# Patient Record
Sex: MALE | Race: BLACK OR AFRICAN AMERICAN | NOT HISPANIC OR LATINO | Employment: UNEMPLOYED | URBAN - METROPOLITAN AREA
[De-identification: names, ages, dates, MRNs, and addresses within clinical notes are randomized per-mention and may not be internally consistent; named-entity substitution may affect disease eponyms.]

---

## 2023-11-28 ENCOUNTER — OFFICE VISIT (OUTPATIENT)
Dept: PRIMARY CARE | Facility: HOSPITAL | Age: 68
End: 2023-11-28
Payer: COMMERCIAL

## 2023-11-28 VITALS
HEIGHT: 72 IN | SYSTOLIC BLOOD PRESSURE: 123 MMHG | HEART RATE: 68 BPM | BODY MASS INDEX: 34.4 KG/M2 | OXYGEN SATURATION: 95 % | TEMPERATURE: 98.7 F | DIASTOLIC BLOOD PRESSURE: 75 MMHG | WEIGHT: 254 LBS

## 2023-11-28 DIAGNOSIS — Z13.220 SCREENING FOR HYPERLIPIDEMIA: ICD-10-CM

## 2023-11-28 DIAGNOSIS — M79.2 NEUROPATHIC PAIN: ICD-10-CM

## 2023-11-28 DIAGNOSIS — K59.00 CONSTIPATION, UNSPECIFIED CONSTIPATION TYPE: ICD-10-CM

## 2023-11-28 DIAGNOSIS — R42 DIZZINESS: ICD-10-CM

## 2023-11-28 DIAGNOSIS — M19.90 ARTHRITIS: ICD-10-CM

## 2023-11-28 DIAGNOSIS — Z13.1 DIABETES MELLITUS SCREENING: ICD-10-CM

## 2023-11-28 DIAGNOSIS — Z13.0 SCREENING FOR DEFICIENCY ANEMIA: Primary | ICD-10-CM

## 2023-11-28 DIAGNOSIS — Z12.5 SCREENING PSA (PROSTATE SPECIFIC ANTIGEN): ICD-10-CM

## 2023-11-28 DIAGNOSIS — M31.6 TEMPORAL ARTERITIS (MULTI): ICD-10-CM

## 2023-11-28 DIAGNOSIS — Z87.891 SMOKING HX: ICD-10-CM

## 2023-11-28 PROBLEM — R20.0 NUMBNESS AND TINGLING: Status: ACTIVE | Noted: 2023-11-28

## 2023-11-28 PROBLEM — H52.7 REFRACTION ERROR: Status: ACTIVE | Noted: 2023-11-28

## 2023-11-28 PROBLEM — M25.519 SHOULDER PAIN: Status: ACTIVE | Noted: 2023-11-28

## 2023-11-28 PROBLEM — H04.122 DRY EYE SYNDROME OF LEFT LACRIMAL GLAND: Status: ACTIVE | Noted: 2023-11-28

## 2023-11-28 PROBLEM — H04.129 DRY EYE SYNDROME: Status: ACTIVE | Noted: 2023-11-28

## 2023-11-28 PROBLEM — N52.9 ERECTILE DYSFUNCTION: Status: ACTIVE | Noted: 2023-11-28

## 2023-11-28 PROBLEM — R20.2 NUMBNESS AND TINGLING: Status: ACTIVE | Noted: 2023-11-28

## 2023-11-28 PROBLEM — M54.50 LOWER BACK PAIN: Status: ACTIVE | Noted: 2023-11-28

## 2023-11-28 PROBLEM — H52.02 HYPEROPIA OF LEFT EYE: Status: ACTIVE | Noted: 2023-11-28

## 2023-11-28 PROBLEM — S05.8X1A BLUNT TRAUMA OF RIGHT EYE: Status: ACTIVE | Noted: 2023-11-28

## 2023-11-28 PROBLEM — H25.812 COMBINED FORM OF AGE-RELATED CATARACT, LEFT EYE: Status: ACTIVE | Noted: 2023-11-28

## 2023-11-28 PROBLEM — R73.03 PREDIABETES: Status: ACTIVE | Noted: 2023-11-28

## 2023-11-28 PROBLEM — H26.9 CATARACT, LEFT: Status: ACTIVE | Noted: 2023-11-28

## 2023-11-28 PROBLEM — H25.13 NUCLEAR SCLEROSIS OF BOTH EYES: Status: ACTIVE | Noted: 2023-11-28

## 2023-11-28 PROBLEM — K21.9 GERD (GASTROESOPHAGEAL REFLUX DISEASE): Status: ACTIVE | Noted: 2023-11-28

## 2023-11-28 PROBLEM — N64.52 NIPPLE DISCHARGE: Status: ACTIVE | Noted: 2023-11-28

## 2023-11-28 PROBLEM — G62.9 NEUROPATHY: Status: ACTIVE | Noted: 2023-11-28

## 2023-11-28 PROBLEM — H40.003 GLAUCOMA SUSPECT OF BOTH EYES: Status: ACTIVE | Noted: 2023-11-28

## 2023-11-28 PROBLEM — E11.9 DIABETES MELLITUS TYPE 2 WITHOUT RETINOPATHY (MULTI): Status: ACTIVE | Noted: 2023-11-28

## 2023-11-28 LAB
ALBUMIN SERPL BCP-MCNC: 4.1 G/DL (ref 3.4–5)
ALP SERPL-CCNC: 58 U/L (ref 33–136)
ALT SERPL W P-5'-P-CCNC: 12 U/L (ref 10–52)
ANION GAP SERPL CALC-SCNC: 11 MMOL/L (ref 10–20)
AST SERPL W P-5'-P-CCNC: 10 U/L (ref 9–39)
BILIRUB SERPL-MCNC: 0.4 MG/DL (ref 0–1.2)
BUN SERPL-MCNC: 13 MG/DL (ref 6–23)
CALCIUM SERPL-MCNC: 9 MG/DL (ref 8.6–10.6)
CHLORIDE SERPL-SCNC: 108 MMOL/L (ref 98–107)
CHOLEST SERPL-MCNC: 246 MG/DL (ref 0–199)
CHOLESTEROL/HDL RATIO: 7.5
CO2 SERPL-SCNC: 26 MMOL/L (ref 21–32)
CREAT SERPL-MCNC: 1.08 MG/DL (ref 0.5–1.3)
CRP SERPL-MCNC: 0.54 MG/DL
ERYTHROCYTE [DISTWIDTH] IN BLOOD BY AUTOMATED COUNT: 15.9 % (ref 11.5–14.5)
ERYTHROCYTE [SEDIMENTATION RATE] IN BLOOD BY WESTERGREN METHOD: 33 MM/H (ref 0–20)
EST. AVERAGE GLUCOSE BLD GHB EST-MCNC: 126 MG/DL
GFR SERPL CREATININE-BSD FRML MDRD: 74 ML/MIN/1.73M*2
GLUCOSE BLD MANUAL STRIP-MCNC: 111 MG/DL (ref 74–99)
GLUCOSE SERPL-MCNC: 99 MG/DL (ref 74–99)
HBA1C MFR BLD: 6 %
HCT VFR BLD AUTO: 46.4 % (ref 41–52)
HDLC SERPL-MCNC: 32.6 MG/DL
HGB BLD-MCNC: 14 G/DL (ref 13.5–17.5)
LDLC SERPL CALC-MCNC: 169 MG/DL
MCH RBC QN AUTO: 24.9 PG (ref 26–34)
MCHC RBC AUTO-ENTMCNC: 30.2 G/DL (ref 32–36)
MCV RBC AUTO: 83 FL (ref 80–100)
NON HDL CHOLESTEROL: 213 MG/DL (ref 0–149)
NRBC BLD-RTO: 0 /100 WBCS (ref 0–0)
PLATELET # BLD AUTO: 226 X10*3/UL (ref 150–450)
POTASSIUM SERPL-SCNC: 4.2 MMOL/L (ref 3.5–5.3)
PROT SERPL-MCNC: 6.8 G/DL (ref 6.4–8.2)
PSA SERPL-MCNC: 0.73 NG/ML
RBC # BLD AUTO: 5.62 X10*6/UL (ref 4.5–5.9)
RHEUMATOID FACT SER NEPH-ACNC: <10 IU/ML (ref 0–15)
SODIUM SERPL-SCNC: 141 MMOL/L (ref 136–145)
TRIGL SERPL-MCNC: 222 MG/DL (ref 0–149)
TSH SERPL-ACNC: 2.2 MIU/L (ref 0.44–3.98)
VLDL: 44 MG/DL (ref 0–40)
WBC # BLD AUTO: 6.8 X10*3/UL (ref 4.4–11.3)

## 2023-11-28 PROCEDURE — 80061 LIPID PANEL: CPT

## 2023-11-28 PROCEDURE — 36416 COLLJ CAPILLARY BLOOD SPEC: CPT

## 2023-11-28 PROCEDURE — 36416 COLLJ CAPILLARY BLOOD SPEC: CPT | Mod: GC

## 2023-11-28 PROCEDURE — 1036F TOBACCO NON-USER: CPT

## 2023-11-28 PROCEDURE — 36415 COLL VENOUS BLD VENIPUNCTURE: CPT

## 2023-11-28 PROCEDURE — 90662 IIV NO PRSV INCREASED AG IM: CPT | Mod: GC

## 2023-11-28 PROCEDURE — 99215 OFFICE O/P EST HI 40 MIN: CPT | Mod: GC,25

## 2023-11-28 PROCEDURE — 80053 COMPREHEN METABOLIC PANEL: CPT

## 2023-11-28 PROCEDURE — 82947 ASSAY GLUCOSE BLOOD QUANT: CPT

## 2023-11-28 PROCEDURE — 85027 COMPLETE CBC AUTOMATED: CPT

## 2023-11-28 PROCEDURE — 86200 CCP ANTIBODY: CPT

## 2023-11-28 PROCEDURE — 85652 RBC SED RATE AUTOMATED: CPT

## 2023-11-28 PROCEDURE — 3044F HG A1C LEVEL LT 7.0%: CPT

## 2023-11-28 PROCEDURE — 3074F SYST BP LT 130 MM HG: CPT

## 2023-11-28 PROCEDURE — 86431 RHEUMATOID FACTOR QUANT: CPT

## 2023-11-28 PROCEDURE — 99215 OFFICE O/P EST HI 40 MIN: CPT

## 2023-11-28 PROCEDURE — 3078F DIAST BP <80 MM HG: CPT

## 2023-11-28 PROCEDURE — 86038 ANTINUCLEAR ANTIBODIES: CPT

## 2023-11-28 PROCEDURE — 86235 NUCLEAR ANTIGEN ANTIBODY: CPT

## 2023-11-28 PROCEDURE — 84443 ASSAY THYROID STIM HORMONE: CPT

## 2023-11-28 PROCEDURE — 84153 ASSAY OF PSA TOTAL: CPT

## 2023-11-28 PROCEDURE — 83036 HEMOGLOBIN GLYCOSYLATED A1C: CPT

## 2023-11-28 PROCEDURE — 86140 C-REACTIVE PROTEIN: CPT

## 2023-11-28 PROCEDURE — 3050F LDL-C >= 130 MG/DL: CPT

## 2023-11-28 PROCEDURE — 1125F AMNT PAIN NOTED PAIN PRSNT: CPT

## 2023-11-28 RX ORDER — SILDENAFIL CITRATE 20 MG/1
20 TABLET ORAL AS NEEDED
COMMUNITY
Start: 2018-03-20 | End: 2023-11-28 | Stop reason: WASHOUT

## 2023-11-28 RX ORDER — OMEPRAZOLE 40 MG/1
1 CAPSULE, DELAYED RELEASE ORAL DAILY
COMMUNITY
Start: 2019-10-14 | End: 2024-01-16 | Stop reason: WASHOUT

## 2023-11-28 RX ORDER — TADALAFIL 20 MG/1
20 TABLET ORAL
COMMUNITY
Start: 2019-09-10 | End: 2023-11-28 | Stop reason: WASHOUT

## 2023-11-28 RX ORDER — LIDOCAINE 50 MG/G
1 PATCH TOPICAL DAILY
COMMUNITY
Start: 2023-01-21 | End: 2023-11-28 | Stop reason: SDUPTHER

## 2023-11-28 RX ORDER — PREGABALIN 25 MG/1
25 CAPSULE ORAL 3 TIMES DAILY
Qty: 270 CAPSULE | Refills: 2 | Status: SHIPPED | OUTPATIENT
Start: 2023-11-28 | End: 2024-01-16 | Stop reason: SDUPTHER

## 2023-11-28 RX ORDER — DICLOFENAC SODIUM 10 MG/G
4 GEL TOPICAL 2 TIMES DAILY PRN
Qty: 4 G | Refills: 3 | Status: SHIPPED | OUTPATIENT
Start: 2023-11-28 | End: 2024-01-16 | Stop reason: WASHOUT

## 2023-11-28 RX ORDER — TIZANIDINE HYDROCHLORIDE 2 MG/1
2 CAPSULE, GELATIN COATED ORAL DAILY
COMMUNITY
Start: 2023-01-21 | End: 2023-11-28 | Stop reason: WASHOUT

## 2023-11-28 RX ORDER — POLYETHYLENE GLYCOL 3350 17 G/17G
17 POWDER, FOR SOLUTION ORAL DAILY
Qty: 30 PACKET | Refills: 3 | Status: SHIPPED | OUTPATIENT
Start: 2023-11-28 | End: 2024-01-16 | Stop reason: WASHOUT

## 2023-11-28 RX ORDER — LIDOCAINE 50 MG/G
1 PATCH TOPICAL DAILY
Qty: 30 PATCH | Refills: 2 | Status: SHIPPED | OUTPATIENT
Start: 2023-11-28 | End: 2024-02-26

## 2023-11-28 RX ORDER — MELOXICAM 15 MG/1
15 TABLET ORAL DAILY
COMMUNITY
Start: 2023-03-15 | End: 2023-11-28 | Stop reason: WASHOUT

## 2023-11-28 RX ORDER — METFORMIN HYDROCHLORIDE 500 MG/1
2 TABLET ORAL DAILY
COMMUNITY
Start: 2017-09-11 | End: 2023-11-28 | Stop reason: WASHOUT

## 2023-11-28 RX ORDER — IBUPROFEN 600 MG/1
600 TABLET ORAL EVERY 8 HOURS PRN
COMMUNITY
Start: 2023-09-15 | End: 2023-11-28 | Stop reason: WASHOUT

## 2023-11-28 RX ORDER — DICLOFENAC SODIUM 10 MG/G
4 GEL TOPICAL 2 TIMES DAILY PRN
COMMUNITY
Start: 2023-05-04 | End: 2023-11-28 | Stop reason: SDUPTHER

## 2023-11-28 RX ORDER — AMOXICILLIN 500 MG/1
500 CAPSULE ORAL EVERY 8 HOURS SCHEDULED
COMMUNITY
Start: 2023-09-11 | End: 2023-11-28 | Stop reason: WASHOUT

## 2023-11-28 ASSESSMENT — ENCOUNTER SYMPTOMS
LOSS OF SENSATION IN FEET: 0
DEPRESSION: 0
OCCASIONAL FEELINGS OF UNSTEADINESS: 0

## 2023-11-28 ASSESSMENT — LIFESTYLE VARIABLES
HOW MANY STANDARD DRINKS CONTAINING ALCOHOL DO YOU HAVE ON A TYPICAL DAY: PATIENT DOES NOT DRINK
HOW OFTEN DO YOU HAVE A DRINK CONTAINING ALCOHOL: NEVER
HOW OFTEN DO YOU HAVE SIX OR MORE DRINKS ON ONE OCCASION: NEVER
SKIP TO QUESTIONS 9-10: 1
AUDIT-C TOTAL SCORE: 0

## 2023-11-28 ASSESSMENT — PATIENT HEALTH QUESTIONNAIRE - PHQ9
1. LITTLE INTEREST OR PLEASURE IN DOING THINGS: NOT AT ALL
2. FEELING DOWN, DEPRESSED OR HOPELESS: NOT AT ALL
SUM OF ALL RESPONSES TO PHQ9 QUESTIONS 1 & 2: 0

## 2023-11-28 ASSESSMENT — PAIN SCALES - GENERAL: PAINLEVEL: 8

## 2023-11-28 NOTE — PROGRESS NOTES
Chief complaint: bilateral leg pain    HPI:  Kamaljit Champion Jr. is a 68 y.o. male w/ PMH of Pre-DM and ED who presents today for follow up.     Pt was last seen in 7/2022 when he endorsed dizzy spells which he reported are still there. Pt reported that he does feel as if the room is spinning, and stated getting up makes dizziness worse but, not turning his head. Pt reported he has not taken anything for the dizziness.  He stated that he also has been having monocular vision changes and temporal sided headaches. He also reported that for the past 2 -3 years he has been having leg pain/swelling. Pt described the pain as a burning sensation that starts from the knees to the bottom of his feet. Pt has been taking Ibuprofen and Tylenol for the pain and they have provided minimal relief. Pt denied polyuria and polydipsia. Pt has not been taking his metformin for the past year. Pt took Duloxetine and Gabapentin previously but, his symptoms were not improved with medications. Pt also endorsed rectal ulcer for the past 2 months which has not been improving. He denied any leakage of any stool/urine/blood from ulcer. Pt denied taking anything for the issue. Lastly, pt endorsed left sided shoulder pain for the past 6 months. Pt descried the sensation as sharp, made worse with movement. Previously was on Tylenol and Ibuprofen which provided minimal relief. Pt stated that he ran out of all his medication last year and has not taken anything since.        Health maintenance:  Health Maintenance   Topic Date Due    Yearly Adult Physical  Never done    Lipid Panel  Never done    Diabetes: Hemoglobin A1C  Never done    COVID-19 Vaccine (1) Never done    Diabetes: Foot Exam  Never done    Diabetes: Retinopathy Screening  Never done    Hepatitis C Screening  Never done    Diabetes: Urine Protein Screening  Never done    Zoster Vaccines (2 of 3) 05/12/2015    Pneumococcal Vaccine: 65+ Years (3 - PPSV23 or PCV20) 03/17/2020    DTaP/Tdap/Td  Vaccines (2 - Td or Tdap) 01/20/2024    Colorectal Cancer Screening  03/10/2027    Influenza Vaccine  Completed    HIB Vaccines  Aged Out    Hepatitis B Vaccines  Aged Out    IPV Vaccines  Aged Out    Hepatitis A Vaccines  Aged Out    Meningococcal Vaccine  Aged Out    Rotavirus Vaccines  Aged Out    HPV Vaccines  Aged Out    Irritable Bowel Syndrome  Discontinued       Medications:    Current Outpatient Medications:     diclofenac sodium (Voltaren) 1 % gel gel, Apply 1 Application topically 2 times a day as needed (left shoulder pain)., Disp: 4 g, Rfl: 3    lidocaine (Lidoderm) 5 % patch, Place 1 patch over 12 hours on the skin once daily., Disp: 30 patch, Rfl: 2    omeprazole (PriLOSEC) 40 mg DR capsule, Take 1 capsule (40 mg) by mouth once daily., Disp: , Rfl:     polyethylene glycol (Glycolax, Miralax) 17 gram packet, Take 17 g by mouth once daily. Mix 1 cap (17g) into 8 ounces of fluid., Disp: 30 packet, Rfl: 3    pregabalin (Lyrica) 25 mg capsule, Take 1 capsule (25 mg) by mouth 3 times a day., Disp: 270 capsule, Rfl: 2    Allergies:  No Known Allergies    Past medical history:  Past Medical History:   Diagnosis Date    Left testicular pain 09/10/2019    Testicular pain, left    Personal history of nicotine dependence 03/19/2019    History of nicotine dependence    Personal history of other endocrine, nutritional and metabolic disease     History of diabetes mellitus    Pruritus ani 09/10/2019    Perianal itch    Spermatocele of epididymis, unspecified 09/10/2019    Spermatocele       Surgical history:  Past Surgical History:   Procedure Laterality Date    OTHER SURGICAL HISTORY  02/10/2017    Globe Enucleation Right       Family history:  No family history on file.    Social history:  Smoking- Stopped 8 years ago, 40 years ½ PPD  EOTH-Denied  Rec Drug- Crack Cocaine     Review of systems:  Constitutional: negative for fevers, chills, weight loss, weight gain, change in appetite, fatigue, weakness.  HEENT: +  headache, + changes in vision, negative hearing loss, congestion, sore throat.  Respiratory: negative for SOB, cough, hemoptysis, wheezing  Cardiovascular: negative for chest pain, palpitations, orthopnea, PND  GI: negative for dysphagia, abdominal pain, nausea, vomiting, diarrhea, constipation, melena, hematochezia, BRBPR  : negative for frequency, urgency, dysuria, hematuria, incontinence  MSK: negative for myalgia, + arthralgia, decreased joint ROM, LE swelling  Skin: negative for rash, wounds  Heme/lymph: negative for easy bruising, bleeding, epistaxis  Neuro: negative for LOC, numbness, tingling, tremor, +vertigo, +dizziness    Vitals:  Vitals:    11/28/23 1507   BP: 123/75   Pulse: 68   Temp: 37.1 °C (98.7 °F)   SpO2: 95%       Physical exam:  Constitutional: Well-developed male in no acute distress.  HEENT: Normocephalic, atraumatic. PERRL. EOMI. No cervical lymphadenopathy. Temporal tenderness  Respiratory: CTA bilaterally. No wheezes, rales, or rhonchi. Normal respiratory effort.  Cardiovascular: RRR. No murmurs, gallops, or rubs. No JVD. Radial pulses 2+.  Abdominal: Soft, nondistended, nontender to palpation. Bowel sounds present. No hepatosplenomegaly or masses. No CVA tenderness.  Neuro: CN II-XII intact. UE and LE strength 5/5 bilaterally and sensation intact. Normal FTN testing.  MSK: No LE edema bilaterally. Bilateral lower extremity tenderness  Skin: Warm, dry. No rashes or wounds.  Psych: Appropriate mood and affect.    Labs:  Results for orders placed or performed in visit on 11/28/23 (from the past 24 hour(s))   POCT GLUCOSE   Result Value Ref Range    POCT Glucose 111 (H) 74 - 99 mg/dL       Imaging:  No results found.    Assessment and plan:  Kamaljit Champion Jr. is a 68 y.o. male w/ PMH of Pre-DM and ED who presents today for follow up.     #Dizziness  :: Ddx BPPV vs Dysequilibrium  :: Orothstats negative  -Neuro referral for possible Vestibular rehab as well as evaluation of  neuropathy  -Consider Meclizine 12.5 mg PRN for vertigo at next visit    #Headache/Vision Changes  :: Possibly could be suggestive of Temporal arteritis  :: Has known nuclear senile cataract of left eye  -Held of on Steroids during this encounter per pt preference but, can discuss starting at next visit   -General surgery referral   -ESR, CRP, SHER, RFP-ANTI-CCP pending     #Left Sided Shoulder Pain  -Lidocaine patch PRN  -Diclofenac PRN  -Referral Physical Therapy    #Constipation  -Miralax 17 gm daily PRN     #Pre DM, neuropathic pain:  - last A1C 6.3 in 2017; will repeat A1C today   - Holding on restarting Metformin and will discuss restart after A1c   -Pregabalin 25 TID for neuropathic pain  -Neuro referral for neuropathic pain  -Optho exam-(11/2023)-without retinopathy but, did have Nuclear senile cataract of left eye      #Nipple discharge-resolved   - breast US in 2020 wnl     #GERD  -Omeprazole 40 daily  -patient instructed to avoid late meals and avoid laying down immediately after eating     #ED  -Previously was following with Urology, last saw urology 10/2019  -Holding off on ED meds given dizziness, can discuss at next visit  -referral ordered   -Previously was having yearly PSA's with Urology, last PSA 3/2019: 0.7, repeat ordered today    #Rectal Ulcer  -Will discuss if not improving at next visit    Health Maintenance:  Pneumovax (3/2015) and prevnar (1/2015)  Zostavax (3/2015)  Influeza: Ordered today  Colonoscopy completed 3/2017 (5 mm polyp in cecum; path:sessile serrated, 5 mm polyp in transverse colon- tubular adenoma) Repeat due in 3 years 2020; Pt deferred and wanted to discuss at next visit, he reported that he will try to send in his Cologaurd  COVID vaccine: vaccinated and boosted according to patient   AAA screening: Abdominal U/S pending  Lung Cancer Screening: Low dose CT- ordered     Follow-up in 2 weeks.    Patient and plan discussed with attending physician Dr. Young.    Domo Iglesias,  MD  PGY-2 Internal Medicine  [facility]

## 2023-11-28 NOTE — PATIENT INSTRUCTIONS
As discussed today, our plan is:     Labs - you can get this done today or come back anytime in the next 4-6 weeks at any  facility.  Medication : Please start taking Pregabalin 25 three times per day  Consultations - you were referred to see the following specialists: General Surgery for biopsy of the artery on your head called the temporal artery, Neurology for your dizziness  Please go to ED if you have worsening vision loss or dizziness.     Please call [phone] ([phone]) to schedule your appointments and imaging.     Please come back to see me in: 2 Weeks  ------  If you have any problems or questions, please contact the clinic at [phone] to leave a message.  Our fax number is [phone]. If your issue cannot wait until the next business day, please go to urgent care or the emergency department.     I also strongly urge all of my patients to register for [facility] by going to: [email or web address]  (The  staff can also send you a text/email link to register when you check out).    No shows: It is understandable if you are unable to make it to a visit, but please cancel your appointment instead of not showing up. This helps to give other patients access to primary care and keeps wait times low.      Dr. Domo Iglesias

## 2023-11-29 LAB — CCP IGG SERPL-ACNC: <1 U/ML

## 2023-11-29 NOTE — PROGRESS NOTES
I saw and evaluated the patient. I personally obtained the key and critical portions of the history and physical exam or was physically present for key and critical portions performed by the resident/fellow. I reviewed the resident/fellow's documentation and discussed the patient with the resident/fellow. I agree with the resident/fellow's medical decision making as documented in the note.      NAME: Kamaljit Champion Jr.   HPI:   68 year old male here for followup.  He notes room spinning, worse with standing.  He also endorses bilateral leg pain, swelling, burning down to feet.  Previously on duloxetine and gabapentin and neither helpful.  Denies polyuria or polydipsia.  He notes left sided shoulder pain for a while.    PMHx: pre-diabetes, ED, dizziness  Meds: topical diclofenac, lidocaine patch, lyrica, miralax, omeprazole,, stopped his metformin  Allergies:   Fam Hx:  SOCIAL HX:   OBJECTIVE: 135/89, 89( standing up and feeling dizzy), no nystagmus appreciated in his left eye,  right eye not present.    RECOMMEND:  Feel that we need to r/o temporal arteritis.  He is prediabetic and stopped his metformin so will not start steroids but send labs and refer to surgery for biopsy  His dizzy spells are associated with bitemporal headaches.  Took his BP while he was standing and does not seem orthostatic or hypotensive or tachycardic as source of head pain or dizziness  Gabapentin, duloxetine unhelpful for leg pain.  Lyrica present med.  Would check his sugars to see if this is worsening his neuropathy.  Consider neuro referral for EMG if not done prior.  Also he has never had an MRI of the lumbar spine to see if there is an explanation for the leg discomfort  Labs ordered  Prevention reviewed  Screening reviewed  Need to think about his home situation as well  Amanda Young MD

## 2023-12-03 LAB
ANA PATTERN: ABNORMAL
ANA SER QL HEP2 SUBST: POSITIVE
ANA TITR SER IF: ABNORMAL {TITER}

## 2023-12-04 LAB
CENTROMERE B AB SER-ACNC: <0.2 AI
CHROMATIN AB SERPL-ACNC: <0.2 AI
DSDNA AB SER-ACNC: <1 IU/ML
ENA JO1 AB SER QL IA: <0.2 AI
ENA RNP AB SER IA-ACNC: <0.2 AI
ENA SCL70 AB SER QL IA: <0.2 AI
ENA SM AB SER IA-ACNC: <0.2 AI
ENA SM+RNP AB SER QL IA: <0.2 AI
ENA SS-A AB SER IA-ACNC: <0.2 AI
ENA SS-B AB SER IA-ACNC: <0.2 AI
RIBOSOMAL P AB SER-ACNC: <0.2 AI

## 2023-12-05 ENCOUNTER — TELEPHONE (OUTPATIENT)
Dept: GASTROENTEROLOGY | Facility: HOSPITAL | Age: 68
End: 2023-12-05
Payer: COMMERCIAL

## 2023-12-05 DIAGNOSIS — E78.00 PURE HYPERCHOLESTEROLEMIA: Primary | ICD-10-CM

## 2023-12-05 RX ORDER — ATORVASTATIN CALCIUM 20 MG/1
20 TABLET, FILM COATED ORAL DAILY
Qty: 30 TABLET | Refills: 11 | Status: SHIPPED | OUTPATIENT
Start: 2023-12-05 | End: 2024-06-04 | Stop reason: ALTCHOICE

## 2023-12-05 NOTE — TELEPHONE ENCOUNTER
Called patient and discussed blood work. Started Atorvastatin 20mg daily given ASCVD risk of 23.6%. Patient was notified. SHER reflex unremarkable.

## 2023-12-11 ENCOUNTER — HOSPITAL ENCOUNTER (OUTPATIENT)
Dept: VASCULAR MEDICINE | Facility: HOSPITAL | Age: 68
Discharge: HOME | End: 2023-12-11
Payer: COMMERCIAL

## 2023-12-11 DIAGNOSIS — Z87.891 SMOKING HX: ICD-10-CM

## 2023-12-11 DIAGNOSIS — Z13.6 ENCOUNTER FOR SCREENING FOR CARDIOVASCULAR DISORDERS: ICD-10-CM

## 2023-12-11 PROCEDURE — 76706 US ABDL AORTA SCREEN AAA: CPT

## 2023-12-11 PROCEDURE — 76706 US ABDL AORTA SCREEN AAA: CPT | Performed by: INTERNAL MEDICINE

## 2023-12-12 NOTE — PROGRESS NOTES
HISTORY OF PRESENTING ILLNESS: Kamaljit Champion Jr. is a 68yoM with c/o anal ulcer.    Today he comes into the office to have this evaluated and when asked about an anal ulcer he stated that he does not have an anal ulcer and is not aware of what we were referring to.  He denies any bloody bowel movements he denies any abdominal pain.  He has no trouble with oral intake.  Denies any rectal pain.  He has daily bowel movements without any issues.  No pelvic radiation.  No perianal surgeries    His last colonoscopy was in 2017 with Dr. Liset Hickman, at which time an SSA and TA were resected.  He was due to repeat in 5 years.      Past Medical History:   Diagnosis Date    Left testicular pain 09/10/2019    Testicular pain, left    Personal history of nicotine dependence 03/19/2019    History of nicotine dependence    Personal history of other endocrine, nutritional and metabolic disease     History of diabetes mellitus    Pruritus ani 09/10/2019    Perianal itch    Spermatocele of epididymis, unspecified 09/10/2019    Spermatocele         Past Surgical History:   Procedure Laterality Date    OTHER SURGICAL HISTORY  02/10/2017    Globe Enucleation Right         Social History     Tobacco Use    Smoking status: Never    Smokeless tobacco: Never   Substance Use Topics    Alcohol use: Never    Drug use: Never         No family history on file.        Current Outpatient Medications:     atorvastatin (Lipitor) 20 mg tablet, Take 1 tablet (20 mg) by mouth once daily., Disp: 30 tablet, Rfl: 11    diclofenac sodium (Voltaren) 1 % gel gel, Apply 1 Application topically 2 times a day as needed (left shoulder pain)., Disp: 4 g, Rfl: 3    lidocaine (Lidoderm) 5 % patch, Place 1 patch over 12 hours on the skin once daily., Disp: 30 patch, Rfl: 2    omeprazole (PriLOSEC) 40 mg DR capsule, Take 1 capsule (40 mg) by mouth once daily., Disp: , Rfl:     polyethylene glycol (Glycolax, Miralax) 17 gram packet, Take 17 g by mouth once daily.  Mix 1 cap (17g) into 8 ounces of fluid., Disp: 30 packet, Rfl: 3    pregabalin (Lyrica) 25 mg capsule, Take 1 capsule (25 mg) by mouth 3 times a day., Disp: 270 capsule, Rfl: 2       No Known Allergies      REVIEW OF SYSTEMS:  Review of Systems   Constitutional: Negative.    Respiratory: Negative.     Cardiovascular: Negative.    Gastrointestinal: Negative.    Genitourinary: Negative.    Skin: Negative.    Neurological:  Positive for dizziness.   Psychiatric/Behavioral: Negative.         Labs:   @RESUFAST (ALBUMIN, BMP, CBC, CEA, CMP, HGBA1C, PATHREP)@    Imaging:  Vascular US Abdominal Aorta Aneurysm AAA Screening    Result Date: 12/12/2023            [place] [address]   Tel [phone] and Fax [phone]  Vascular Lab Report [facility] [place] US ABDOMINAL AORTA ANEURYSM AAA SCREENING  Patient Name:      KALEY HARP JRBernie    Reading Physician:  [id number] Mcgarry MD Study Date:        12/11/2023          Ordering Physician: [id number] TORSTEN BARDALES MRN/PID:           [id number]            Technologist:       Cindy Pastor RVT Accession#:        [id number]        Technologist 2: Date of Birth/Age: 1955 / 68     Encounter#:         [id number]                    years Gender:            M Admission Status:  Outpatient          Location Performed: [facility]  Diagnosis/ICD: Encounter for screening for cardiovascular disorders (AAA)-Z13.6 CPT Codes:     98683 Ultrasound, abdominal aorta, real time with image                documentation, screening study for (AAA)  Smoker: Former.  CONCLUSIONS: Aorta/Common Iliac Arteries/IVC: The visualized segments of the abdominal aorta demonstrate no evidence of aneurysm. Technically limited due to overlying bowel gas and body habitus.  Imaging & Doppler Findings:  AORTA   AP    Lateral    PSV  Mid  2.29 cm 2.34 cm 81.0 cm/s  [id number] Lois Mcgarry MD Electronically signed  by [id number] Lois Mcgarry MD on 12/12/2023 at 10:12:41 AM  ** Final **        Physical Exam:  Physical Exam  Constitutional:       Appearance: Normal appearance.   HENT:      Head: Normocephalic and atraumatic.   Eyes:      Extraocular Movements: Extraocular movements intact.   Cardiovascular:      Rate and Rhythm: Normal rate and regular rhythm.   Pulmonary:      Effort: Pulmonary effort is normal.      Breath sounds: Normal breath sounds.   Abdominal:      General: Abdomen is flat.      Palpations: Abdomen is soft.   Genitourinary:     Comments: Perianal exam was normal.  In the posterior and anterior midline of the anal verge there was skin changes consistent with excess moisture.  Posterior midline there was some evidence of ectropion.  On digital exam there was good tone.  There were no masses.  Skin:     General: Skin is warm and dry.   Neurological:      Mental Status: He is alert.   Psychiatric:         Mood and Affect: Mood normal.       ASSESSMENT AND PLAN: 68-year-old male with complaints to his PCP of a rectal ulcer who has no evidence of perianal disease.  I have encouraged him to get a repeat colonoscopy given his prior results from his scope and this new complaint.  I spent some time explaining the utility of a colonoscopy and its evidence suggesting reduction in prevalence of colon and rectal cancers.      Cristal Bonilla RN   12/12/2023

## 2023-12-13 ENCOUNTER — OFFICE VISIT (OUTPATIENT)
Dept: SURGERY | Facility: CLINIC | Age: 68
End: 2023-12-13
Payer: COMMERCIAL

## 2023-12-13 VITALS
BODY MASS INDEX: 34.05 KG/M2 | HEIGHT: 72 IN | HEART RATE: 78 BPM | DIASTOLIC BLOOD PRESSURE: 81 MMHG | RESPIRATION RATE: 16 BRPM | WEIGHT: 251.4 LBS | SYSTOLIC BLOOD PRESSURE: 117 MMHG

## 2023-12-13 DIAGNOSIS — Z12.11 COLON CANCER SCREENING: ICD-10-CM

## 2023-12-13 DIAGNOSIS — M31.6 TEMPORAL ARTERITIS (MULTI): ICD-10-CM

## 2023-12-13 PROCEDURE — 3074F SYST BP LT 130 MM HG: CPT | Performed by: COLON & RECTAL SURGERY

## 2023-12-13 PROCEDURE — 1159F MED LIST DOCD IN RCRD: CPT | Performed by: COLON & RECTAL SURGERY

## 2023-12-13 PROCEDURE — 1125F AMNT PAIN NOTED PAIN PRSNT: CPT | Performed by: COLON & RECTAL SURGERY

## 2023-12-13 PROCEDURE — 99204 OFFICE O/P NEW MOD 45 MIN: CPT | Performed by: COLON & RECTAL SURGERY

## 2023-12-13 PROCEDURE — 3079F DIAST BP 80-89 MM HG: CPT | Performed by: COLON & RECTAL SURGERY

## 2023-12-13 PROCEDURE — 3050F LDL-C >= 130 MG/DL: CPT | Performed by: COLON & RECTAL SURGERY

## 2023-12-13 PROCEDURE — 3044F HG A1C LEVEL LT 7.0%: CPT | Performed by: COLON & RECTAL SURGERY

## 2023-12-13 PROCEDURE — 1036F TOBACCO NON-USER: CPT | Performed by: COLON & RECTAL SURGERY

## 2023-12-13 ASSESSMENT — ENCOUNTER SYMPTOMS
CARDIOVASCULAR NEGATIVE: 1
PSYCHIATRIC NEGATIVE: 1
CONSTITUTIONAL NEGATIVE: 1
DIZZINESS: 1
GASTROINTESTINAL NEGATIVE: 1
RESPIRATORY NEGATIVE: 1

## 2023-12-14 ENCOUNTER — OFFICE VISIT (OUTPATIENT)
Dept: NEUROLOGY | Facility: HOSPITAL | Age: 68
End: 2023-12-14
Payer: COMMERCIAL

## 2023-12-14 VITALS
HEIGHT: 72 IN | BODY MASS INDEX: 34 KG/M2 | WEIGHT: 251 LBS | TEMPERATURE: 97.1 F | HEART RATE: 50 BPM | RESPIRATION RATE: 18 BRPM | DIASTOLIC BLOOD PRESSURE: 59 MMHG | SYSTOLIC BLOOD PRESSURE: 120 MMHG

## 2023-12-14 DIAGNOSIS — G62.9 NEUROPATHY: ICD-10-CM

## 2023-12-14 DIAGNOSIS — R42 DIZZINESS: ICD-10-CM

## 2023-12-14 DIAGNOSIS — R51.0 ORTHOSTATIC HEADACHE: Primary | ICD-10-CM

## 2023-12-14 PROCEDURE — 3078F DIAST BP <80 MM HG: CPT | Performed by: PSYCHIATRY & NEUROLOGY

## 2023-12-14 PROCEDURE — 99205 OFFICE O/P NEW HI 60 MIN: CPT | Performed by: PSYCHIATRY & NEUROLOGY

## 2023-12-14 PROCEDURE — 1159F MED LIST DOCD IN RCRD: CPT | Performed by: PSYCHIATRY & NEUROLOGY

## 2023-12-14 PROCEDURE — 1125F AMNT PAIN NOTED PAIN PRSNT: CPT | Performed by: PSYCHIATRY & NEUROLOGY

## 2023-12-14 PROCEDURE — 3074F SYST BP LT 130 MM HG: CPT | Performed by: PSYCHIATRY & NEUROLOGY

## 2023-12-14 PROCEDURE — 3050F LDL-C >= 130 MG/DL: CPT | Performed by: PSYCHIATRY & NEUROLOGY

## 2023-12-14 PROCEDURE — 99215 OFFICE O/P EST HI 40 MIN: CPT | Performed by: PSYCHIATRY & NEUROLOGY

## 2023-12-14 PROCEDURE — 1036F TOBACCO NON-USER: CPT | Performed by: PSYCHIATRY & NEUROLOGY

## 2023-12-14 PROCEDURE — 3044F HG A1C LEVEL LT 7.0%: CPT | Performed by: PSYCHIATRY & NEUROLOGY

## 2023-12-14 ASSESSMENT — PATIENT HEALTH QUESTIONNAIRE - PHQ9
2. FEELING DOWN, DEPRESSED OR HOPELESS: NOT AT ALL
1. LITTLE INTEREST OR PLEASURE IN DOING THINGS: NOT AT ALL
SUM OF ALL RESPONSES TO PHQ9 QUESTIONS 1 AND 2: 0

## 2023-12-14 ASSESSMENT — PAIN SCALES - GENERAL: PAINLEVEL: 5
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
## 2023-12-14 NOTE — PATIENT INSTRUCTIONS
Call [phone] to schedule your EMG and autonomic test.     Call [phone] to schedule your MRI test.

## 2023-12-18 ENCOUNTER — TELEPHONE (OUTPATIENT)
Dept: PRIMARY CARE | Facility: HOSPITAL | Age: 68
End: 2023-12-18
Payer: COMMERCIAL

## 2023-12-18 NOTE — TELEPHONE ENCOUNTER
Patient's MRI was ordered for 12/15/23. There's nothing I can do to move this forward. This is likely due to the holiday schedule and availability per scheduling.

## 2023-12-20 ENCOUNTER — HOSPITAL ENCOUNTER (OUTPATIENT)
Dept: RADIOLOGY | Facility: HOSPITAL | Age: 68
Discharge: HOME | End: 2023-12-20
Payer: COMMERCIAL

## 2023-12-20 DIAGNOSIS — Z87.891 SMOKING HX: ICD-10-CM

## 2023-12-20 PROCEDURE — 71271 CT THORAX LUNG CANCER SCR C-: CPT

## 2023-12-20 PROCEDURE — 71271 CT THORAX LUNG CANCER SCR C-: CPT | Performed by: RADIOLOGY

## 2024-01-03 ENCOUNTER — APPOINTMENT (OUTPATIENT)
Dept: RADIOLOGY | Facility: HOSPITAL | Age: 69
End: 2024-01-03
Payer: COMMERCIAL

## 2024-01-03 ENCOUNTER — DOCUMENTATION (OUTPATIENT)
Dept: PHYSICAL THERAPY | Facility: CLINIC | Age: 69
End: 2024-01-03
Payer: COMMERCIAL

## 2024-01-03 NOTE — PROGRESS NOTES
Physical Therapy                 Therapy Communication Note    Patient Name: Kamaljit Champion Jr.  MRN: [id number]  Today's Date: 1/3/2024     Discipline: Physical Therapy    Missed Visit Reason:  No Show    Missed Time: No Show 01/03/2024 - PT Evaluation.

## 2024-01-05 DIAGNOSIS — Z86.010 HX OF ADENOMATOUS POLYP OF COLON: Primary | ICD-10-CM

## 2024-01-05 RX ORDER — POLYETHYLENE GLYCOL-3350 AND ELECTROLYTES WITH FLAVOR PACK 240; 5.84; 2.98; 6.72; 22.72 G/278.26G; G/278.26G; G/278.26G; G/278.26G; G/278.26G
4000 POWDER, FOR SOLUTION ORAL ONCE
Qty: 4000 ML | Refills: 0 | Status: SHIPPED | OUTPATIENT
Start: 2024-01-05 | End: 2024-01-05

## 2024-01-08 ENCOUNTER — TELEPHONE (OUTPATIENT)
Dept: PRIMARY CARE | Facility: HOSPITAL | Age: 69
End: 2024-01-08
Payer: COMMERCIAL

## 2024-01-08 DIAGNOSIS — G44.52 NEW DAILY PERSISTENT HEADACHE: Primary | ICD-10-CM

## 2024-01-08 PROBLEM — R51.0 ORTHOSTATIC HEADACHE: Status: ACTIVE | Noted: 2024-01-08

## 2024-01-08 PROBLEM — H26.9 CATARACT: Status: ACTIVE | Noted: 2023-11-28

## 2024-01-08 PROBLEM — F17.200 NICOTINE DEPENDENCE: Status: ACTIVE | Noted: 2020-04-07

## 2024-01-08 PROBLEM — R20.2 NUMBNESS AND TINGLING SENSATION OF SKIN: Status: ACTIVE | Noted: 2023-11-28

## 2024-01-08 PROBLEM — G62.9 POLYNEUROPATHY: Status: ACTIVE | Noted: 2023-11-28

## 2024-01-08 PROBLEM — H40.009 GLAUCOMA SUSPECT: Status: ACTIVE | Noted: 2023-11-28

## 2024-01-08 PROBLEM — M19.90 ARTHRITIS: Status: ACTIVE | Noted: 2023-11-28

## 2024-01-08 PROBLEM — N52.9 IMPOTENCE: Status: ACTIVE | Noted: 2020-04-07

## 2024-01-08 PROBLEM — N64.52 DISCHARGE FROM NIPPLE: Status: ACTIVE | Noted: 2023-11-28

## 2024-01-08 PROBLEM — M31.6 TEMPORAL ARTERITIS (MULTI): Status: ACTIVE | Noted: 2023-11-28

## 2024-01-08 PROBLEM — S05.90XA BLUNT TRAUMA OF EYE: Status: ACTIVE | Noted: 2023-11-28

## 2024-01-08 PROBLEM — K02.51 DENTAL CARIES ON PIT AND FISSURE SURFACE LIMITED TO ENAMEL: Status: ACTIVE | Noted: 2017-05-18

## 2024-01-08 PROBLEM — K21.9 GASTROESOPHAGEAL REFLUX DISEASE: Status: ACTIVE | Noted: 2020-04-07

## 2024-01-08 PROBLEM — H04.129 TEAR FILM INSUFFICIENCY: Status: ACTIVE | Noted: 2023-11-28

## 2024-01-08 PROBLEM — H04.122 DRY EYE SYNDROME OF LEFT EYE: Status: ACTIVE | Noted: 2020-04-07

## 2024-01-08 PROBLEM — M54.50 LOW BACK PAIN: Status: ACTIVE | Noted: 2020-04-07

## 2024-01-08 PROBLEM — R42 DIZZINESS: Status: ACTIVE | Noted: 2022-07-08

## 2024-01-08 PROBLEM — M79.2 NEUROPATHIC PAIN: Status: ACTIVE | Noted: 2023-11-28

## 2024-01-08 PROBLEM — Z86.39 HISTORY OF DIABETES MELLITUS: Status: ACTIVE | Noted: 2024-01-08

## 2024-01-08 PROBLEM — H52.7 DISORDER OF REFRACTION: Status: ACTIVE | Noted: 2023-11-28

## 2024-01-08 PROBLEM — R20.0 NUMBNESS AND TINGLING SENSATION OF SKIN: Status: ACTIVE | Noted: 2023-11-28

## 2024-01-08 PROBLEM — N50.819 PAIN IN TESTICLE: Status: ACTIVE | Noted: 2024-01-08

## 2024-01-08 PROBLEM — R51.9 HEADACHE: Status: ACTIVE | Noted: 2023-12-14

## 2024-01-08 RX ORDER — OLOPATADINE HYDROCHLORIDE 2 MG/ML
1 SOLUTION/ DROPS OPHTHALMIC DAILY
COMMUNITY
Start: 2021-10-12 | End: 2024-01-16 | Stop reason: WASHOUT

## 2024-01-08 RX ORDER — NORTRIPTYLINE HYDROCHLORIDE 10 MG/1
10 CAPSULE ORAL NIGHTLY
COMMUNITY
Start: 2017-09-11 | End: 2024-01-16 | Stop reason: WASHOUT

## 2024-01-08 RX ORDER — CLOTRIMAZOLE AND BETAMETHASONE DIPROPIONATE 10; .64 MG/G; MG/G
1 CREAM TOPICAL 2 TIMES DAILY
COMMUNITY
Start: 2019-09-10 | End: 2024-01-16 | Stop reason: SDUPTHER

## 2024-01-08 RX ORDER — GABAPENTIN 300 MG/1
300 CAPSULE ORAL 3 TIMES DAILY
COMMUNITY
Start: 2018-08-17 | End: 2024-01-16 | Stop reason: WASHOUT

## 2024-01-08 RX ORDER — POLYETHYLENE GLYCOL 3350 17 G/17G
POWDER, FOR SOLUTION ORAL
COMMUNITY
Start: 2023-11-28 | End: 2024-06-04 | Stop reason: WASHOUT

## 2024-01-08 RX ORDER — TRAMADOL HYDROCHLORIDE 50 MG/1
50 TABLET ORAL 3 TIMES DAILY
COMMUNITY
Start: 2019-01-29 | End: 2024-01-16 | Stop reason: WASHOUT

## 2024-01-08 RX ORDER — DULOXETIN HYDROCHLORIDE 60 MG/1
60 CAPSULE, DELAYED RELEASE ORAL
COMMUNITY
Start: 2020-03-30 | End: 2024-01-16 | Stop reason: WASHOUT

## 2024-01-08 RX ORDER — ACETAMINOPHEN 500 MG
500 TABLET ORAL EVERY 4 HOURS PRN
COMMUNITY
Start: 2020-04-07 | End: 2024-06-04 | Stop reason: WASHOUT

## 2024-01-08 RX ORDER — CARBOXYMETHYLCELLULOSE SODIUM 5 MG/ML
1 SOLUTION/ DROPS OPHTHALMIC
COMMUNITY
Start: 2021-10-12 | End: 2024-01-16 | Stop reason: SDUPTHER

## 2024-01-08 RX ORDER — DOCUSATE SODIUM 100 MG/1
100 CAPSULE, LIQUID FILLED ORAL
COMMUNITY
Start: 2017-11-16 | End: 2024-06-04 | Stop reason: SDUPTHER

## 2024-01-08 RX ORDER — BACLOFEN 10 MG/1
10 TABLET ORAL
COMMUNITY
Start: 2019-01-29 | End: 2024-01-16 | Stop reason: SDUPTHER

## 2024-01-08 RX ORDER — ATORVASTATIN CALCIUM 40 MG/1
40 TABLET, FILM COATED ORAL NIGHTLY
COMMUNITY
Start: 2014-02-25 | End: 2024-06-04 | Stop reason: ALTCHOICE
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
## 2024-01-16 ENCOUNTER — OFFICE VISIT (OUTPATIENT)
Dept: PRIMARY CARE | Facility: HOSPITAL | Age: 69
End: 2024-01-16
Payer: COMMERCIAL

## 2024-01-16 ENCOUNTER — HOSPITAL ENCOUNTER (OUTPATIENT)
Dept: NEUROLOGY | Facility: HOSPITAL | Age: 69
Discharge: HOME | End: 2024-01-16
Payer: COMMERCIAL

## 2024-01-16 ENCOUNTER — APPOINTMENT (OUTPATIENT)
Dept: NEUROLOGY | Facility: HOSPITAL | Age: 69
End: 2024-01-16
Payer: COMMERCIAL

## 2024-01-16 VITALS
DIASTOLIC BLOOD PRESSURE: 57 MMHG | TEMPERATURE: 96.5 F | BODY MASS INDEX: 20.86 KG/M2 | HEART RATE: 50 BPM | SYSTOLIC BLOOD PRESSURE: 135 MMHG | HEIGHT: 72 IN | WEIGHT: 154 LBS

## 2024-01-16 DIAGNOSIS — M79.2 NEUROPATHIC PAIN: ICD-10-CM

## 2024-01-16 DIAGNOSIS — Z13.1 DIABETES MELLITUS SCREENING: ICD-10-CM

## 2024-01-16 DIAGNOSIS — G62.9 NEUROPATHY: ICD-10-CM

## 2024-01-16 DIAGNOSIS — M31.6 TEMPORAL ARTERITIS (MULTI): ICD-10-CM

## 2024-01-16 DIAGNOSIS — K21.9 GASTROESOPHAGEAL REFLUX DISEASE, UNSPECIFIED WHETHER ESOPHAGITIS PRESENT: Primary | ICD-10-CM

## 2024-01-16 DIAGNOSIS — H04.123 DRY EYES: ICD-10-CM

## 2024-01-16 PROCEDURE — 95885 MUSC TST DONE W/NERV TST LIM: CPT | Performed by: PSYCHIATRY & NEUROLOGY

## 2024-01-16 PROCEDURE — 95886 MUSC TEST DONE W/N TEST COMP: CPT | Performed by: PSYCHIATRY & NEUROLOGY

## 2024-01-16 PROCEDURE — 3075F SYST BP GE 130 - 139MM HG: CPT

## 2024-01-16 PROCEDURE — 3078F DIAST BP <80 MM HG: CPT

## 2024-01-16 PROCEDURE — 95911 NRV CNDJ TEST 9-10 STUDIES: CPT | Performed by: PSYCHIATRY & NEUROLOGY

## 2024-01-16 PROCEDURE — 99214 OFFICE O/P EST MOD 30 MIN: CPT | Mod: GC

## 2024-01-16 PROCEDURE — 99214 OFFICE O/P EST MOD 30 MIN: CPT

## 2024-01-16 PROCEDURE — 1125F AMNT PAIN NOTED PAIN PRSNT: CPT

## 2024-01-16 PROCEDURE — 1036F TOBACCO NON-USER: CPT

## 2024-01-16 RX ORDER — CARBOXYMETHYLCELLULOSE SODIUM 5 MG/ML
1 SOLUTION/ DROPS OPHTHALMIC AS NEEDED
Qty: 30 EACH | Refills: 6 | Status: SHIPPED | OUTPATIENT
Start: 2024-01-16 | End: 2024-06-04 | Stop reason: SDUPTHER

## 2024-01-16 RX ORDER — BACLOFEN 10 MG/1
10 TABLET ORAL 2 TIMES DAILY
Qty: 10 TABLET | Refills: 3 | Status: SHIPPED | OUTPATIENT
Start: 2024-01-16 | End: 2024-06-04 | Stop reason: SDUPTHER

## 2024-01-16 RX ORDER — PREGABALIN 25 MG/1
50 CAPSULE ORAL 3 TIMES DAILY
Qty: 180 CAPSULE | Refills: 3 | Status: SHIPPED | OUTPATIENT
Start: 2024-01-16 | End: 2024-06-04 | Stop reason: SDUPTHER

## 2024-01-16 RX ORDER — NALOXONE HYDROCHLORIDE 4 MG/.1ML
4 SPRAY NASAL AS NEEDED
Qty: 2 EACH | Refills: 0 | Status: SHIPPED | OUTPATIENT
Start: 2024-01-16 | End: 2024-06-04 | Stop reason: WASHOUT

## 2024-01-16 RX ORDER — PANTOPRAZOLE SODIUM 40 MG/1
40 TABLET, DELAYED RELEASE ORAL DAILY
Qty: 30 TABLET | Refills: 2 | Status: SHIPPED | OUTPATIENT
Start: 2024-01-16 | End: 2024-03-14 | Stop reason: ALTCHOICE

## 2024-01-16 RX ORDER — TRAMADOL HYDROCHLORIDE 50 MG/1
50 TABLET ORAL 3 TIMES DAILY
Qty: 30 TABLET | Refills: 0 | Status: SHIPPED | OUTPATIENT
Start: 2024-01-16 | End: 2024-06-04 | Stop reason: WASHOUT

## 2024-01-16 RX ORDER — CLOTRIMAZOLE AND BETAMETHASONE DIPROPIONATE 10; .64 MG/G; MG/G
1 CREAM TOPICAL 2 TIMES DAILY
Qty: 15 G | Refills: 3 | Status: SHIPPED | OUTPATIENT
Start: 2024-01-16 | End: 2024-06-04 | Stop reason: WASHOUT

## 2024-01-16 ASSESSMENT — PATIENT HEALTH QUESTIONNAIRE - PHQ9
10. IF YOU CHECKED OFF ANY PROBLEMS, HOW DIFFICULT HAVE THESE PROBLEMS MADE IT FOR YOU TO DO YOUR WORK, TAKE CARE OF THINGS AT HOME, OR GET ALONG WITH OTHER PEOPLE: NOT DIFFICULT AT ALL
1. LITTLE INTEREST OR PLEASURE IN DOING THINGS: SEVERAL DAYS
SUM OF ALL RESPONSES TO PHQ9 QUESTIONS 1 & 2: 2
2. FEELING DOWN, DEPRESSED OR HOPELESS: SEVERAL DAYS

## 2024-01-16 ASSESSMENT — ENCOUNTER SYMPTOMS
OCCASIONAL FEELINGS OF UNSTEADINESS: 0
DEPRESSION: 0
LOSS OF SENSATION IN FEET: 0

## 2024-01-16 ASSESSMENT — PAIN SCALES - GENERAL: PAINLEVEL: 10-WORST PAIN EVER

## 2024-01-16 NOTE — PROGRESS NOTES
Chief complaint: bilateral leg pain    HPI:  Kamaljit Champion Jr. is a 68 y.o. male w/ PMH of Pre-DM and ED who presents today for follow up.     At prior visit patient reported 2 year history of dizziness, monocular vision changes and temporal headaches. Pt reported that he has still been having temporal pain and dizziness. Pt tried taking alleve, Ibuprofen and Tylenol for pain with minimal relief. He also reported that he still having bilateral leg pain. He typically has more pain in the right leg and reports it as a burning sensation. Pt also reported that for the past few years he as noticed increasingly more blurry, has an eye patch on right eye  but, denied having floaters or complete vision loss.  Pt also reported heartburn that has been worsening for the past few weeks and also reported recent onset of dysphagia. He reported that he is able swallow but, feels as though solid get stuck in the middle of hist chest. However this sensation will improve with fluids. ROS was otherwise negative.       Health maintenance:  Health Maintenance   Topic Date Due    Yearly Adult Physical  Never done    COVID-19 Vaccine (1) Never done    Diabetes: Foot Exam  Never done    Hepatitis C Screening  Never done    Diabetes: Urine Protein Screening  Never done    Zoster Vaccines (2 of 3) 05/12/2015    Pneumococcal Vaccine: 65+ Years (3 - PPSV23 or PCV20) 03/17/2020    Diabetes: Retinopathy Screening  06/23/2023    DTaP/Tdap/Td Vaccines (2 - Td or Tdap) 01/20/2024    Diabetes: Hemoglobin A1C  02/28/2024    Lipid Panel  11/28/2024    Colorectal Cancer Screening  03/10/2027    Influenza Vaccine  Completed    HIB Vaccines  Aged Out    Hepatitis B Vaccines  Aged Out    IPV Vaccines  Aged Out    Hepatitis A Vaccines  Aged Out    Meningococcal Vaccine  Aged Out    Rotavirus Vaccines  Aged Out    HPV Vaccines  Aged Out    Irritable Bowel Syndrome  Discontinued       Medications:    Current Outpatient Medications:     acetaminophen (Tylenol)  500 mg tablet, Take 1 tablet (500 mg) by mouth every 4 hours if needed for mild pain (1 - 3)., Disp: , Rfl:     atorvastatin (Lipitor) 20 mg tablet, Take 1 tablet (20 mg) by mouth once daily., Disp: 30 tablet, Rfl: 11    atorvastatin (Lipitor) 40 mg tablet, Take 1 tablet (40 mg) by mouth once daily at bedtime., Disp: , Rfl:     baclofen (Lioresal) 10 mg tablet, Take 1 tablet (10 mg) by mouth., Disp: , Rfl:     clotrimazole-betamethasone (Lotrisone) cream, 1 Application 2 times a day., Disp: , Rfl:     diclofenac sodium (Voltaren) 1 % gel gel, Apply 1 Application topically 2 times a day as needed (left shoulder pain)., Disp: 4 g, Rfl: 3    docusate sodium (Colace) 100 mg capsule, Take 1 capsule (100 mg) by mouth., Disp: , Rfl:     DULoxetine (Cymbalta) 60 mg DR capsule, Take 1 capsule (60 mg) by mouth., Disp: , Rfl:     gabapentin (Neurontin) 300 mg capsule, Take 1 capsule (300 mg) by mouth 3 times a day., Disp: , Rfl:     lidocaine (Lidoderm) 5 % patch, Place 1 patch over 12 hours on the skin once daily., Disp: 30 patch, Rfl: 2    lubricating eye drops (Refresh Plus) ophthalmic solution, Administer 1 drop into both eyes., Disp: , Rfl:     nortriptyline (Pamelor) 10 mg capsule, Take 1 capsule (10 mg) by mouth once daily at bedtime., Disp: , Rfl:     olopatadine (Pataday) 0.2 % ophthalmic solution, Administer 1 drop into both eyes once daily., Disp: , Rfl:     omeprazole (PriLOSEC) 40 mg DR capsule, Take 1 capsule (40 mg) by mouth once daily., Disp: , Rfl:     polyethylene glycol (Glycolax, Miralax) 17 gram packet, Take 17 g by mouth once daily. Mix 1 cap (17g) into 8 ounces of fluid., Disp: 30 packet, Rfl: 3    polyethylene glycol (Glycolax, Miralax) 17 gram/dose powder, , Disp: , Rfl:     pregabalin (Lyrica) 25 mg capsule, Take 1 capsule (25 mg) by mouth 3 times a day., Disp: 270 capsule, Rfl: 2    RANITIDINE HCL ORAL, Take 150 mg by mouth., Disp: , Rfl:     traMADol (Ultram) 50 mg tablet, Take 1 tablet (50 mg) by  mouth 3 times a day., Disp: , Rfl:     Allergies:  No Known Allergies    Past medical history:  Past Medical History:   Diagnosis Date    Left testicular pain 09/10/2019    Testicular pain, left    Personal history of nicotine dependence 03/19/2019    History of nicotine dependence    Personal history of other endocrine, nutritional and metabolic disease     History of diabetes mellitus    Pruritus ani 09/10/2019    Perianal itch    Spermatocele of epididymis, unspecified 09/10/2019    Spermatocele       Surgical history:  Past Surgical History:   Procedure Laterality Date    OTHER SURGICAL HISTORY  02/10/2017    Globe Enucleation Right       Family history:  No family history on file.    Social history:  Smoking- Stopped 8 years ago, 40 years ½ PPD  EOTH-Denied  Rec Drug- Crack Cocaine     Review of systems:  Constitutional: negative for fevers, chills, weight loss, weight gain, change in appetite, fatigue, weakness.  HEENT: + headache, + changes in vision, negative hearing loss, congestion, sore throat.  Respiratory: negative for SOB, cough, hemoptysis, wheezing  Cardiovascular: negative for chest pain, palpitations, orthopnea, PND  GI: negative for dysphagia, abdominal pain, nausea, vomiting, diarrhea, constipation, melena, hematochezia, BRBPR  : negative for frequency, urgency, dysuria, hematuria, incontinence  MSK: negative for myalgia, + arthralgia, decreased joint ROM, LE swelling  Skin: negative for rash, wounds  Heme/lymph: negative for easy bruising, bleeding, epistaxis  Neuro: negative for LOC, numbness, tingling, tremor, +vertigo, +dizziness    Vitals:  There were no vitals filed for this visit.      Physical exam:  Constitutional: Well-developed male in no acute distress.  HEENT: Normocephalic, atraumatic. PERRL. EOMI. No cervical lymphadenopathy.   Respiratory: CTA bilaterally. No wheezes, rales, or rhonchi. Normal respiratory effort.  Cardiovascular: RRR. No murmurs, gallops, or rubs. No JVD. Radial  pulses 2+.  Abdominal: Soft, nondistended, nontender to palpation. Bowel sounds present. No hepatosplenomegaly or masses. No CVA tenderness.  Neuro: CN II-XII intact. UE and LE strength 5/5 bilaterally and sensation intact. Normal FTN testing.  MSK: No LE edema bilaterally. Bilateral lower extremity tenderness  Skin: Warm, dry. No rashes or wounds.  Psych: Appropriate mood and affect.    Labs:  No results found for this or any previous visit (from the past 24 hour(s)).    Imaging:  No results found.    Assessment and plan:  Kamaljit Champion Jr. is a 68 y.o. male w/ PMH of Pre-DM and ED who presents today for follow up.     #Dizziness  #Headache/Vision Changes  :: Neurology c/f CSF leak vs Dysautonomia  :: Orothstats negative  :: (11/2023) SHER(+), Reflex negative  -Neuro ordered MMA, Serum kappa/lambda free light chain, SPEP  -MRI brain and C spine for evidence of CSF leak on hold given he has metal in his head  -Encouraged oral hydration for dizziness  -Tramadol 50 TID ordered for relief until Neurology appointment  -Neuro recommending Temporal artery biopsy, General surgery referral ordered for biopsy  -Neuro follow up for CSF leak    #Blurry vision  -Pt stated he will call his optho for any appointment    #Left Sided Shoulder Pain  -Lidocaine patch PRN  -Referral Physical Therapy still active     #Constipation  -Miralax 17 gm daily PRN  -Docusate     #Pre-DM, neuropathic pain:  :: Last A1C 6.0 in 11/2023  :: Optho exam-(11/2023)-without retinopathy but, did have Nuclear senile cataract of left eye    :: TSH 2.20 (11/2023)  - Holding on restarting Metformin and will discuss restart after repeat A1c at next visit  -Increased to Pregabalin 50 TID for neuropathic pain  -Stopped Gabapentin 300 TID not helping  -EMG pending for neuropathy  -B12, Folate, HIV-pending    #GERD  :: Has alarm system with dysphagia, will need EGD  -Started Pantoprazole 40 daily  -patient instructed to avoid late meals and avoid laying down  immediately after eating  -Emailed GI to add on EGD in addition to his routine colonoscopy      #ED  -Previously was following with Urology, last saw urology 10/2019  -Holding off on ED meds given dizziness, can discuss at next visit  -Previously was having yearly PSA's with Urology, last PSA 3/2019: 0.7, will discuss at next visit  -Will address at next visit    #HLD  ::ASCVD-22.7%  -C/w Atorva 40 daily  -Encouraged lifestyle modification       Health Maintenance:  Pneumovax (3/2015) and prevnar (1/2015)  Zostavax (3/2015)  Influeza: 11/2023  Colonoscopy completed 3/2017 (5 mm polyp in cecum; path:sessile serrated, 5 mm polyp in transverse colon- tubular adenoma) Repeat due in 3 years 2020; Colonoscopy ordered and he reported he will schedule, will also need EGD given dysphagia  COVID vaccine: vaccinated and boosted according to patient   AAA screening: No evidence of aneurysms (11/2023)  Lung Cancer Screening: (11/2023) stable nodulues, repeat in 11/2024    Follow-up in 3 months    Patient and plan discussed with attending physician Dr. Hannah Iglesias MD  PGY-2 Internal Medicine  [facility]

## 2024-01-16 NOTE — PATIENT INSTRUCTIONS
As discussed today, our plan is:     Labs - you can get this done today or come back anytime in the next 4-6 weeks at any  facility.  Medication changes: We started Pantoprazole-Heart Burn, we increased your Pregabalin to 50 three times per day, Tramadol-pain medication for 10 days until your Neurology appointment  Consultations - you were referred to see the following specialists:   (1/16/2024)-EMG (nerve test)- for your ankle and leg pain  (1/26/2024)-Neurology appt for your headache and vision loss- Please ask them about pain medications for your headache  (2/13/2024-Gastroenterologist (Stomach doctor)- for you Colonoscopy and EGD    Please make an appointment with your eye doctor and schedule your Colonoscopy/EGD.      Please call [phone] ([phone]) to schedule your appointments and imaging.     Please come back to see me in: 3 months   ------  If you have any problems or questions, please contact the clinic at [phone] to leave a message.  Our fax number is [phone]. If your issue cannot wait until the next business day, please go to urgent care or the emergency department.     I also strongly urge all of my patients to register for MyChart by going to: [email or web address]  (The  staff can also send you a text/email link to register when you check out).    No shows: It is understandable if you are unable to make it to a visit, but please cancel your appointment instead of not showing up. This helps to give other patients access to primary care and keeps wait times low.      Dr. Domo Iglesias

## 2024-01-22 NOTE — PROGRESS NOTES
I reviewed the resident/fellow's documentation and discussed the patient with the resident/fellow. I agree with the resident/fellow's medical decision making as documented in the note.     Heath Villanueva MD

## 2024-01-31 ENCOUNTER — TELEPHONE (OUTPATIENT)
Dept: PRIMARY CARE | Facility: HOSPITAL | Age: 69
End: 2024-01-31

## 2024-01-31 ENCOUNTER — APPOINTMENT (OUTPATIENT)
Dept: OTOLARYNGOLOGY | Facility: CLINIC | Age: 69
End: 2024-01-31
Payer: COMMERCIAL

## 2024-01-31 DIAGNOSIS — R51.9 INTRACTABLE HEADACHE, UNSPECIFIED CHRONICITY PATTERN, UNSPECIFIED HEADACHE TYPE: Primary | ICD-10-CM

## 2024-01-31 NOTE — TELEPHONE ENCOUNTER
Patient called in wanting to know if he can get a different referral for Neurology from the massive headaches he been having

## 2024-02-07 ENCOUNTER — OFFICE VISIT (OUTPATIENT)
Dept: NEUROLOGY | Facility: HOSPITAL | Age: 69
End: 2024-02-07
Payer: COMMERCIAL

## 2024-02-07 ENCOUNTER — HOSPITAL ENCOUNTER (EMERGENCY)
Facility: HOSPITAL | Age: 69
Discharge: HOME | End: 2024-02-07
Payer: COMMERCIAL

## 2024-02-07 VITALS
TEMPERATURE: 97.3 F | BODY MASS INDEX: 34 KG/M2 | HEART RATE: 70 BPM | DIASTOLIC BLOOD PRESSURE: 83 MMHG | SYSTOLIC BLOOD PRESSURE: 134 MMHG | HEIGHT: 72 IN | WEIGHT: 251 LBS | RESPIRATION RATE: 18 BRPM

## 2024-02-07 VITALS
WEIGHT: 240 LBS | TEMPERATURE: 98.2 F | OXYGEN SATURATION: 96 % | HEART RATE: 74 BPM | RESPIRATION RATE: 16 BRPM | BODY MASS INDEX: 32.51 KG/M2 | HEIGHT: 72 IN | SYSTOLIC BLOOD PRESSURE: 145 MMHG | DIASTOLIC BLOOD PRESSURE: 90 MMHG

## 2024-02-07 DIAGNOSIS — R51.9 INTRACTABLE HEADACHE, UNSPECIFIED CHRONICITY PATTERN, UNSPECIFIED HEADACHE TYPE: ICD-10-CM

## 2024-02-07 DIAGNOSIS — R51.9 NONINTRACTABLE HEADACHE, UNSPECIFIED CHRONICITY PATTERN, UNSPECIFIED HEADACHE TYPE: Primary | ICD-10-CM

## 2024-02-07 DIAGNOSIS — G44.52 NEW DAILY PERSISTENT HEADACHE: Primary | ICD-10-CM

## 2024-02-07 LAB
ALBUMIN SERPL BCP-MCNC: 4.2 G/DL (ref 3.4–5)
ALP SERPL-CCNC: 55 U/L (ref 33–136)
ALT SERPL W P-5'-P-CCNC: 7 U/L (ref 10–52)
ANION GAP SERPL CALC-SCNC: 14 MMOL/L (ref 10–20)
AST SERPL W P-5'-P-CCNC: 10 U/L (ref 9–39)
BASOPHILS # BLD AUTO: 0.06 X10*3/UL (ref 0–0.1)
BASOPHILS NFR BLD AUTO: 0.9 %
BILIRUB SERPL-MCNC: 0.5 MG/DL (ref 0–1.2)
BUN SERPL-MCNC: 12 MG/DL (ref 6–23)
CALCIUM SERPL-MCNC: 9.3 MG/DL (ref 8.6–10.6)
CHLORIDE SERPL-SCNC: 106 MMOL/L (ref 98–107)
CO2 SERPL-SCNC: 23 MMOL/L (ref 21–32)
CREAT SERPL-MCNC: 0.94 MG/DL (ref 0.5–1.3)
CRP SERPL-MCNC: 0.51 MG/DL
EGFRCR SERPLBLD CKD-EPI 2021: 87 ML/MIN/1.73M*2
EOSINOPHIL # BLD AUTO: 0.15 X10*3/UL (ref 0–0.7)
EOSINOPHIL NFR BLD AUTO: 2.3 %
ERYTHROCYTE [DISTWIDTH] IN BLOOD BY AUTOMATED COUNT: 15.2 % (ref 11.5–14.5)
ERYTHROCYTE [SEDIMENTATION RATE] IN BLOOD BY WESTERGREN METHOD: 26 MM/H (ref 0–20)
GLUCOSE SERPL-MCNC: 81 MG/DL (ref 74–99)
HCT VFR BLD AUTO: 42.6 % (ref 41–52)
HGB BLD-MCNC: 14.1 G/DL (ref 13.5–17.5)
HOLD SPECIMEN: NORMAL
IMM GRANULOCYTES # BLD AUTO: 0.03 X10*3/UL (ref 0–0.7)
IMM GRANULOCYTES NFR BLD AUTO: 0.5 % (ref 0–0.9)
LYMPHOCYTES # BLD AUTO: 2.47 X10*3/UL (ref 1.2–4.8)
LYMPHOCYTES NFR BLD AUTO: 37.6 %
MCH RBC QN AUTO: 25.2 PG (ref 26–34)
MCHC RBC AUTO-ENTMCNC: 33.1 G/DL (ref 32–36)
MCV RBC AUTO: 76 FL (ref 80–100)
MONOCYTES # BLD AUTO: 0.49 X10*3/UL (ref 0.1–1)
MONOCYTES NFR BLD AUTO: 7.5 %
NEUTROPHILS # BLD AUTO: 3.37 X10*3/UL (ref 1.2–7.7)
NEUTROPHILS NFR BLD AUTO: 51.2 %
NRBC BLD-RTO: 0 /100 WBCS (ref 0–0)
PLATELET # BLD AUTO: 215 X10*3/UL (ref 150–450)
POTASSIUM SERPL-SCNC: 3.9 MMOL/L (ref 3.5–5.3)
PROT SERPL-MCNC: 7.4 G/DL (ref 6.4–8.2)
RBC # BLD AUTO: 5.6 X10*6/UL (ref 4.5–5.9)
SODIUM SERPL-SCNC: 139 MMOL/L (ref 136–145)
WBC # BLD AUTO: 6.6 X10*3/UL (ref 4.4–11.3)

## 2024-02-07 PROCEDURE — 1036F TOBACCO NON-USER: CPT | Performed by: PSYCHIATRY & NEUROLOGY

## 2024-02-07 PROCEDURE — 36415 COLL VENOUS BLD VENIPUNCTURE: CPT | Performed by: PHYSICIAN ASSISTANT

## 2024-02-07 PROCEDURE — 99215 OFFICE O/P EST HI 40 MIN: CPT | Mod: GC | Performed by: PSYCHIATRY & NEUROLOGY

## 2024-02-07 PROCEDURE — 99285 EMERGENCY DEPT VISIT HI MDM: CPT | Performed by: PHYSICIAN ASSISTANT

## 2024-02-07 PROCEDURE — 85025 COMPLETE CBC W/AUTO DIFF WBC: CPT | Performed by: PHYSICIAN ASSISTANT

## 2024-02-07 PROCEDURE — 99284 EMERGENCY DEPT VISIT MOD MDM: CPT | Performed by: PHYSICIAN ASSISTANT

## 2024-02-07 PROCEDURE — 99215 OFFICE O/P EST HI 40 MIN: CPT | Performed by: PSYCHIATRY & NEUROLOGY

## 2024-02-07 PROCEDURE — 3075F SYST BP GE 130 - 139MM HG: CPT | Performed by: PSYCHIATRY & NEUROLOGY

## 2024-02-07 PROCEDURE — 86140 C-REACTIVE PROTEIN: CPT | Performed by: PHYSICIAN ASSISTANT

## 2024-02-07 PROCEDURE — 1159F MED LIST DOCD IN RCRD: CPT | Performed by: PSYCHIATRY & NEUROLOGY

## 2024-02-07 PROCEDURE — 1125F AMNT PAIN NOTED PAIN PRSNT: CPT | Performed by: PSYCHIATRY & NEUROLOGY

## 2024-02-07 PROCEDURE — 3079F DIAST BP 80-89 MM HG: CPT | Performed by: PSYCHIATRY & NEUROLOGY

## 2024-02-07 PROCEDURE — 85652 RBC SED RATE AUTOMATED: CPT | Performed by: PHYSICIAN ASSISTANT

## 2024-02-07 PROCEDURE — 80053 COMPREHEN METABOLIC PANEL: CPT | Performed by: PHYSICIAN ASSISTANT

## 2024-02-07 RX ORDER — PREDNISONE 20 MG/1
20 TABLET ORAL DAILY
Qty: 30 TABLET | Refills: 1 | Status: SHIPPED | OUTPATIENT
Start: 2024-02-07 | End: 2024-02-08 | Stop reason: SDUPTHER

## 2024-02-07 RX ORDER — FERROUS SULFATE, DRIED 160(50) MG
1 TABLET, EXTENDED RELEASE ORAL DAILY
Qty: 30 TABLET | Refills: 11 | Status: SHIPPED | OUTPATIENT
Start: 2024-02-07 | End: 2024-06-04 | Stop reason: WASHOUT

## 2024-02-07 RX ORDER — PANTOPRAZOLE SODIUM 20 MG/1
20 TABLET, DELAYED RELEASE ORAL
Qty: 30 TABLET | Refills: 11 | Status: SHIPPED | OUTPATIENT
Start: 2024-02-07 | End: 2024-03-14 | Stop reason: ALTCHOICE

## 2024-02-07 ASSESSMENT — LIFESTYLE VARIABLES
EVER HAD A DRINK FIRST THING IN THE MORNING TO STEADY YOUR NERVES TO GET RID OF A HANGOVER: NO
EVER FELT BAD OR GUILTY ABOUT YOUR DRINKING: NO
HAVE YOU EVER FELT YOU SHOULD CUT DOWN ON YOUR DRINKING: NO
HAVE PEOPLE ANNOYED YOU BY CRITICIZING YOUR DRINKING: NO

## 2024-02-07 ASSESSMENT — PAIN DESCRIPTION - DESCRIPTORS: DESCRIPTORS: ACHING

## 2024-02-07 ASSESSMENT — PAIN SCALES - GENERAL
PAINLEVEL_OUTOF10: 8
PAINLEVEL: 8

## 2024-02-07 ASSESSMENT — PAIN - FUNCTIONAL ASSESSMENT: PAIN_FUNCTIONAL_ASSESSMENT: 0-10

## 2024-02-07 ASSESSMENT — COLUMBIA-SUICIDE SEVERITY RATING SCALE - C-SSRS
6. HAVE YOU EVER DONE ANYTHING, STARTED TO DO ANYTHING, OR PREPARED TO DO ANYTHING TO END YOUR LIFE?: NO
1. IN THE PAST MONTH, HAVE YOU WISHED YOU WERE DEAD OR WISHED YOU COULD GO TO SLEEP AND NOT WAKE UP?: NO
2. HAVE YOU ACTUALLY HAD ANY THOUGHTS OF KILLING YOURSELF?: NO

## 2024-02-07 ASSESSMENT — PAIN DESCRIPTION - LOCATION: LOCATION: HEAD

## 2024-02-07 ASSESSMENT — PAIN DESCRIPTION - PAIN TYPE: TYPE: ACUTE PAIN;CHRONIC PAIN

## 2024-02-07 NOTE — ED TRIAGE NOTES
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
## 2024-02-07 NOTE — PATIENT INSTRUCTIONS
Mr. Champion it was a pleasure seeing you.    We suspect you have temporal arteritis which needs urgent treatment. You should got the emergency room for expedited evaluation by ophthalmology and get a sample of your temporal artery.     You should take prednisone 60mg daily (3 tablets of 20mg each). Also take pantoprazole, calcium and vitamin D.    We should see you in 1 month.    You should see rheumatology clinic soon.    Garry Phillips, Neurology resident PGY-2  Juan A Irwin, Neurologist
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
## 2024-02-08 RX ORDER — PREDNISONE 20 MG/1
60 TABLET ORAL DAILY
Qty: 30 TABLET | Refills: 1 | Status: SHIPPED | OUTPATIENT
Start: 2024-02-08 | End: 2024-02-08 | Stop reason: SDUPTHER

## 2024-02-08 RX ORDER — PREDNISONE 20 MG/1
60 TABLET ORAL DAILY
Qty: 180 TABLET | Refills: 0 | Status: SHIPPED | OUTPATIENT
Start: 2024-02-08 | End: 2024-04-08
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
## 2024-02-08 NOTE — DISCHARGE INSTRUCTIONS
Use artificial tears in your eye as needed.  Follow-up with neurology for your headaches, call [phone] to schedule an appointment.

## 2024-02-09 DIAGNOSIS — K21.9 GASTROESOPHAGEAL REFLUX DISEASE, UNSPECIFIED WHETHER ESOPHAGITIS PRESENT: Primary | ICD-10-CM

## 2024-02-09 NOTE — PROGRESS NOTES
I was asked by patient's PCP to add on EGD due to dysphagia and GERD.  Spoke with patient - he has several year history of heartburn which is sometimes severe but denies dysphagia.  Has dental issues which means it is difficult to chew food (no dentures).  He is however amenable to having EGD at time of colonoscopy which is scheduled for next week.  Will ask schedulers to add EGD to already scheduled colonoscopy.

## 2024-02-13 ENCOUNTER — HOSPITAL ENCOUNTER (OUTPATIENT)
Dept: GASTROENTEROLOGY | Facility: HOSPITAL | Age: 69
Setting detail: OUTPATIENT SURGERY
Discharge: HOME | End: 2024-02-13
Payer: COMMERCIAL

## 2024-02-13 VITALS
SYSTOLIC BLOOD PRESSURE: 124 MMHG | BODY MASS INDEX: 32.51 KG/M2 | DIASTOLIC BLOOD PRESSURE: 82 MMHG | TEMPERATURE: 97.7 F | OXYGEN SATURATION: 96 % | HEIGHT: 72 IN | HEART RATE: 75 BPM | RESPIRATION RATE: 13 BRPM | WEIGHT: 240 LBS

## 2024-02-13 DIAGNOSIS — K21.9 GASTROESOPHAGEAL REFLUX DISEASE, UNSPECIFIED WHETHER ESOPHAGITIS PRESENT: ICD-10-CM

## 2024-02-13 DIAGNOSIS — Z86.010 HISTORY OF ADENOMATOUS POLYP OF COLON: ICD-10-CM

## 2024-02-13 DIAGNOSIS — Z12.11 COLON CANCER SCREENING: Primary | ICD-10-CM

## 2024-02-13 DIAGNOSIS — D12.6 SESSILE SERRATED POLYP OF COLON: ICD-10-CM

## 2024-02-13 PROCEDURE — 99152 MOD SED SAME PHYS/QHP 5/>YRS: CPT | Performed by: INTERNAL MEDICINE

## 2024-02-13 PROCEDURE — 43239 EGD BIOPSY SINGLE/MULTIPLE: CPT | Performed by: INTERNAL MEDICINE

## 2024-02-13 PROCEDURE — 87900 PHENOTYPE INFECT AGENT DRUG: CPT | Performed by: INTERNAL MEDICINE

## 2024-02-13 PROCEDURE — 7100000009 HC PHASE TWO TIME - INITIAL BASE CHARGE: Performed by: INTERNAL MEDICINE

## 2024-02-13 PROCEDURE — 3700000013 HC SEDATION LEVEL 5+ TIME - EACH ADDITIONAL 15 MINUTES: Performed by: INTERNAL MEDICINE

## 2024-02-13 PROCEDURE — 88305 TISSUE EXAM BY PATHOLOGIST: CPT | Performed by: PATHOLOGY

## 2024-02-13 PROCEDURE — 3700000012 HC SEDATION LEVEL 5+ TIME - INITIAL 15 MINUTES 5/> YEARS: Performed by: INTERNAL MEDICINE

## 2024-02-13 PROCEDURE — 0753T DGTZ GLS MCRSCP SLD LEVEL IV: CPT | Mod: TC,SUR | Performed by: INTERNAL MEDICINE

## 2024-02-13 PROCEDURE — 2500000004 HC RX 250 GENERAL PHARMACY W/ HCPCS (ALT 636 FOR OP/ED): Mod: SE | Performed by: INTERNAL MEDICINE

## 2024-02-13 PROCEDURE — G0105 COLORECTAL SCRN; HI RISK IND: HCPCS | Performed by: INTERNAL MEDICINE

## 2024-02-13 PROCEDURE — 45378 DIAGNOSTIC COLONOSCOPY: CPT | Mod: 52 | Performed by: INTERNAL MEDICINE

## 2024-02-13 PROCEDURE — 7100000010 HC PHASE TWO TIME - EACH INCREMENTAL 1 MINUTE: Performed by: INTERNAL MEDICINE

## 2024-02-13 PROCEDURE — 99153 MOD SED SAME PHYS/QHP EA: CPT | Performed by: INTERNAL MEDICINE

## 2024-02-13 RX ORDER — MIDAZOLAM HYDROCHLORIDE 1 MG/ML
INJECTION, SOLUTION INTRAMUSCULAR; INTRAVENOUS AS NEEDED
Status: COMPLETED | OUTPATIENT
Start: 2024-02-13 | End: 2024-02-13

## 2024-02-13 RX ORDER — POLYETHYLENE GLYCOL-3350 AND ELECTROLYTES WITH FLAVOR PACK 240; 5.84; 2.98; 6.72; 22.72 G/278.26G; G/278.26G; G/278.26G; G/278.26G; G/278.26G
4000 POWDER, FOR SOLUTION ORAL ONCE
Qty: 4000 ML | Refills: 0 | Status: SHIPPED | OUTPATIENT
Start: 2024-02-13 | End: 2024-02-13

## 2024-02-13 RX ORDER — POLYETHYLENE GLYCOL 3350 17 G/17G
17 POWDER, FOR SOLUTION ORAL DAILY
Qty: 119 G | Refills: 0 | Status: SHIPPED | OUTPATIENT
Start: 2024-02-13 | End: 2024-06-04 | Stop reason: WASHOUT

## 2024-02-13 RX ORDER — FENTANYL CITRATE 50 UG/ML
INJECTION, SOLUTION INTRAMUSCULAR; INTRAVENOUS AS NEEDED
Status: COMPLETED | OUTPATIENT
Start: 2024-02-13 | End: 2024-02-13

## 2024-02-13 RX ORDER — SODIUM CHLORIDE, SODIUM LACTATE, POTASSIUM CHLORIDE, CALCIUM CHLORIDE 600; 310; 30; 20 MG/100ML; MG/100ML; MG/100ML; MG/100ML
20 INJECTION, SOLUTION INTRAVENOUS CONTINUOUS
Status: DISCONTINUED | OUTPATIENT
Start: 2024-02-13 | End: 2024-02-14 | Stop reason: HOSPADM

## 2024-02-13 RX ADMIN — MIDAZOLAM 2 MG: 1 INJECTION INTRAMUSCULAR; INTRAVENOUS at 13:42

## 2024-02-13 RX ADMIN — FENTANYL CITRATE 50 MCG: 50 INJECTION, SOLUTION INTRAMUSCULAR; INTRAVENOUS at 13:42

## 2024-02-13 RX ADMIN — FENTANYL CITRATE 25 MCG: 50 INJECTION, SOLUTION INTRAMUSCULAR; INTRAVENOUS at 13:56

## 2024-02-13 RX ADMIN — MIDAZOLAM 1 MG: 1 INJECTION INTRAMUSCULAR; INTRAVENOUS at 13:56

## 2024-02-13 ASSESSMENT — PAIN SCALES - GENERAL
PAINLEVEL_OUTOF10: 0 - NO PAIN
PAINLEVEL_OUTOF10: 1
PAINLEVEL_OUTOF10: 0 - NO PAIN
PAINLEVEL_OUTOF10: 8
PAINLEVEL_OUTOF10: 0 - NO PAIN
PAINLEVEL_OUTOF10: 0 - NO PAIN

## 2024-02-13 ASSESSMENT — COLUMBIA-SUICIDE SEVERITY RATING SCALE - C-SSRS
1. IN THE PAST MONTH, HAVE YOU WISHED YOU WERE DEAD OR WISHED YOU COULD GO TO SLEEP AND NOT WAKE UP?: NO
6. HAVE YOU EVER DONE ANYTHING, STARTED TO DO ANYTHING, OR PREPARED TO DO ANYTHING TO END YOUR LIFE?: NO
2. HAVE YOU ACTUALLY HAD ANY THOUGHTS OF KILLING YOURSELF?: NO

## 2024-02-13 ASSESSMENT — PAIN - FUNCTIONAL ASSESSMENT
PAIN_FUNCTIONAL_ASSESSMENT: 0-10
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
## 2024-02-13 NOTE — DISCHARGE INSTRUCTIONS
Patient Instructions after a Colonoscopy      The anesthetics, sedatives or narcotics which were given to you today will be acting in your body for the next 24 hours, so you might feel a little sleepy or groggy.  This feeling should slowly wear off. Carefully read and follow the instructions.     You received sedation today:  - Do not drive or operate any machinery or power tools of any kind.   - No alcoholic beverages today, not even beer or wine.  - Do not make any important decisions or sign any legal documents.  - No over the counter medications that contain alcohol or that may cause drowsiness.  - Do not make any important decisions or sign any legal documents.    While it is common to experience mild to moderate abdominal distention, gas, or belching after your procedure, if any of these symptoms occur following discharge from the GI Lab or within one week of having your procedure, call the [facility] to be advised whether a visit to your nearest Urgent Care or Emergency Department is indicated.  Take this paper with you if you go.     - If you develop an allergic reaction to the medications that were given during your procedure such as difficulty breathing, rash, hives, severe nausea, vomiting or lightheadedness.  - If you experience chest pain, shortness of breath, severe abdominal pain, fevers and chills.  -If you develop signs and symptoms of bleeding such as blood in your spit, if your stools turn black, tarry, or bloody  - If you have not urinated within 8 hours following your procedure.  - If your IV site becomes painful, red, inflamed, or looks infected.    If you received a biopsy/polypectomy/sphincterotomy the following instructions apply below:    __ Do not use Aspirin containing products, non-steroidal medications or anti-coagulants for one week following your procedure. (Examples of these types of medications are: Advil, Arthrotec, Aleve, Coumadin, Ecotrin, Heparin, Ibuprofen,  Indocin, Motrin, Naprosyn, Nuprin, Plavix, Vioxx, and Voltarin, or their generic forms.  This list is not all-inclusive.  Check with your physician or pharmacist before resuming medications.)   __ Eat a soft diet today.  Avoid foods that are poorly digested for the next 24 hours.  These foods would include: nuts, beans, lettuce, red meats, and fried foods. Start with liquids and advance your diet as tolerated, gradually work up to eating solids.   __ Do not have a Barium Study or Enema for one week.    Your physician recommends the additional following instructions:    -You have a contact number available for emergencies. The signs and symptoms of potential delayed complications were discussed with you. You may return to normal activities tomorrow.  -Resume your previous diet.  -Continue your present medications.   -We are waiting for your pathology results.  -Your physician has recommended a repeat colonoscopy for surveillance due to inadequate bowel prep.  -The findings and recommendations have been discussed with you.  -The findings and recommendations were discussed with your family.  - Please see Medication Reconciliation Form for new medication/medications prescribed.       If you experience any problems or have any questions following discharge from the GI Lab, please call:        Nurse Signature                                                                        Date___________________                                                                            Patient/Responsible Party Signature                                        Date___________________

## 2024-02-13 NOTE — H&P
History Of Present Illness  Kamaljit Champion Jr. is a 68 y.o. male presenting with GERD and history of colon polyp on colonoscopy 2017 (SSA and TA).  No family history of CRC.  Has some dental issues (no teeth).  Started pantoprazole last month but it is only partially helping.  He is not taking it on an empty stomach however.  Longstanding GERD symptoms for many years, almost daily.     Past Medical History  Past Medical History:   Diagnosis Date    Left testicular pain 09/10/2019    Testicular pain, left    Personal history of nicotine dependence 03/19/2019    History of nicotine dependence    Personal history of other endocrine, nutritional and metabolic disease     History of diabetes mellitus    Pruritus ani 09/10/2019    Perianal itch    Spermatocele of epididymis, unspecified 09/10/2019    Spermatocele     Surgical History  Past Surgical History:   Procedure Laterality Date    OTHER SURGICAL HISTORY  02/10/2017    Globe Enucleation Right     Social History  He reports that he has never smoked. He has never used smokeless tobacco. He reports that he does not drink alcohol and does not use drugs.    Family History  No family history on file.     Allergies  No Known Allergies  Review of Systems  Pre-sedation Evaluation:  ASA Classification - ASA 2 - Patient with mild systemic disease with no functional limitations  Mallampati Score - III (soft and hard palate and base of uvula visible)    Physical Exam  Cardiovascular:      Rate and Rhythm: Normal rate and regular rhythm.   Pulmonary:      Effort: Pulmonary effort is normal.      Breath sounds: Normal breath sounds.   Abdominal:      Palpations: Abdomen is soft.        Last Recorded Vitals  Blood pressure 130/84, pulse 73, temperature 36.5 °C (97.7 °F), resp. rate 18, height 1.829 m (6'), weight 109 kg (240 lb), SpO2 95 %.     Assessment/Plan   EGD/colonoscopy with moderate sedation     PTA/Current Medications:  (Not in a hospital admission)    Current Outpatient  Medications   Medication Sig Dispense Refill    atorvastatin (Lipitor) 20 mg tablet Take 1 tablet (20 mg) by mouth once daily. 30 tablet 11    atorvastatin (Lipitor) 40 mg tablet Take 1 tablet (40 mg) by mouth once daily at bedtime.      acetaminophen (Tylenol) 500 mg tablet Take 1 tablet (500 mg) by mouth every 4 hours if needed for mild pain (1 - 3).      baclofen (Lioresal) 10 mg tablet Take 1 tablet (10 mg) by mouth 2 times a day. (Patient not taking: Reported on 2/13/2024) 10 tablet 3    calcium carbonate-vitamin D3 500 mg-5 mcg (200 unit) tablet Take 1 tablet by mouth once daily. (Patient not taking: Reported on 2/13/2024) 30 tablet 11    clotrimazole-betamethasone (Lotrisone) cream Apply 1 Application topically 2 times a day. (Patient not taking: Reported on 2/13/2024) 15 g 3    dextran 70-hypromellose (Bion Tears) 0.1-0.3 % ophthalmic solution Administer 1 drop into the left eye 4 times a day as needed for dry eyes. (Patient not taking: Reported on 2/13/2024) 15 mL 0    docusate sodium (Colace) 100 mg capsule Take 1 capsule (100 mg) by mouth.      lidocaine (Lidoderm) 5 % patch Place 1 patch over 12 hours on the skin once daily. (Patient not taking: Reported on 2/13/2024) 30 patch 2    lubricating eye drops (Refresh Plus) ophthalmic solution Administer 1 drop into both eyes if needed for dry eyes. (Patient not taking: Reported on 2/13/2024) 30 each 6    naloxone (Narcan) 4 mg/0.1 mL nasal spray Administer 1 spray (4 mg) into affected nostril(s) if needed for opioid reversal. May repeat every 2-3 minutes if needed, alternating nostrils, until medical assistance becomes available. (Patient not taking: Reported on 2/13/2024) 2 each 0    pantoprazole (ProtoNix) 20 mg EC tablet Take 1 tablet (20 mg) by mouth once daily in the morning. Take before meals. Do not crush, chew, or split. (Patient not taking: Reported on 2/13/2024) 30 tablet 11    pantoprazole (ProtoNix) 40 mg EC tablet Take 1 tablet (40 mg) by mouth  once daily. Do not crush, chew, or split. (Patient not taking: Reported on 2/13/2024) 30 tablet 2    polyethylene glycol (Glycolax, Miralax) 17 gram/dose powder       predniSONE (Deltasone) 20 mg tablet Take 3 tablets (60 mg) by mouth once daily. (Patient not taking: Reported on 2/13/2024) 180 tablet 0    pregabalin (Lyrica) 25 mg capsule Take 2 capsules (50 mg) by mouth 3 times a day. (Patient not taking: Reported on 2/13/2024) 180 capsule 3    RANITIDINE HCL ORAL Take 150 mg by mouth.      traMADol (Ultram) 50 mg tablet Take 1 tablet (50 mg) by mouth 3 times a day. (Patient not taking: Reported on 2/13/2024) 30 tablet 0     Current Facility-Administered Medications   Medication Dose Route Frequency Provider Last Rate Last Admin    lactated Ringer's infusion  20 mL/hr intravenous Continuous MD Winnie Oneil MD

## 2024-02-21 LAB
LABORATORY COMMENT REPORT: NORMAL
PATH REPORT.FINAL DX SPEC: NORMAL
PATH REPORT.GROSS SPEC: NORMAL
PATH REPORT.TOTAL CANCER: NORMAL

## 2024-02-24 DIAGNOSIS — Z12.11 COLON CANCER SCREENING: Primary | ICD-10-CM

## 2024-02-24 RX ORDER — POLYETHYLENE GLYCOL 3350, SODIUM CHLORIDE, SODIUM BICARBONATE, POTASSIUM CHLORIDE 420; 11.2; 5.72; 1.48 G/4L; G/4L; G/4L; G/4L
4000 POWDER, FOR SOLUTION ORAL ONCE
Qty: 4000 ML | Refills: 0 | Status: SHIPPED | OUTPATIENT
Start: 2024-02-24 | End: 2024-02-24

## 2024-02-25 NOTE — PROGRESS NOTES
Bowel preparation has been prescribed for patient's upcoming colonoscopy procedure.    Chin Grady MD  Gastroenterology

## 2024-03-12 DIAGNOSIS — A04.8 HELICOBACTER PYLORI (H. PYLORI) INFECTION: Primary | ICD-10-CM

## 2024-03-13 LAB
ELECTRONICALLY SIGNED BY: NORMAL
H. PYLORI DRUG SUSCEPTIBILITY RESULTS: NORMAL

## 2024-03-14 DIAGNOSIS — A04.8 H. PYLORI INFECTION: Primary | ICD-10-CM

## 2024-03-14 RX ORDER — PANTOPRAZOLE SODIUM 40 MG/1
40 TABLET, DELAYED RELEASE ORAL 2 TIMES DAILY
Qty: 28 TABLET | Refills: 0 | Status: SHIPPED | OUTPATIENT
Start: 2024-03-14 | End: 2024-06-04 | Stop reason: SDUPTHER

## 2024-03-14 RX ORDER — METRONIDAZOLE 500 MG/1
500 TABLET ORAL 3 TIMES DAILY
Qty: 42 TABLET | Refills: 0 | Status: SHIPPED | OUTPATIENT
Start: 2024-03-14 | End: 2024-03-28

## 2024-03-14 RX ORDER — TETRACYCLINE HYDROCHLORIDE 500 MG/1
500 CAPSULE ORAL 4 TIMES DAILY
Qty: 56 CAPSULE | Refills: 0 | Status: SHIPPED | OUTPATIENT
Start: 2024-03-14 | End: 2024-03-28

## 2024-03-14 NOTE — PROGRESS NOTES
Prescribed Quadruple therapy based on H Pylori Susceptibilities and patient was notified. Patient will need to be tested for eradication four weeks or more after completion of antibiotic therapy. PPI therapy will be stopped prior testing.

## 2024-03-20 ENCOUNTER — APPOINTMENT (OUTPATIENT)
Dept: NEUROLOGY | Facility: HOSPITAL | Age: 69
End: 2024-03-20
Payer: COMMERCIAL

## 2024-05-21 ENCOUNTER — DOCUMENTATION (OUTPATIENT)
Dept: GASTROENTEROLOGY | Facility: HOSPITAL | Age: 69
End: 2024-05-21
Payer: COMMERCIAL

## 2024-05-21 NOTE — PROGRESS NOTES
Spoke with patient regarding H. Pylori treatment and need for urea breath test to confirm eradication.  Patient stated that he took the 14 day course of pantoprazole, metronidazole, and tetracycline prescribed by Dr. Iglesias, but did not realize he should also have taken the Pepto-Bismol with them.  Reported he had some reflux symptoms yesterday.  I explained patient needs urea breath testing (previously ordered) to confirm eradication and that his wife should likely be tested for H. Pylori as well.  I recommended repeat colonoscopy given patient had inadequate prep on recent procedure.  I have messaged schedulers to contact the patient regarding colonoscopy and urea breath test scheduling.

## 2024-05-24 ENCOUNTER — TELEPHONE (OUTPATIENT)
Dept: PRIMARY CARE | Facility: HOSPITAL | Age: 69
End: 2024-05-24
Payer: COMMERCIAL

## 2024-05-24 NOTE — TELEPHONE ENCOUNTER
Patient called requesting 2 medication refills. Please call patient he was confused about which medications he states one is an antibiotic. Patient can be reached at [phone]

## 2024-06-04 ENCOUNTER — OFFICE VISIT (OUTPATIENT)
Dept: PRIMARY CARE | Facility: HOSPITAL | Age: 69
End: 2024-06-04
Payer: COMMERCIAL

## 2024-06-04 VITALS
WEIGHT: 250 LBS | OXYGEN SATURATION: 93 % | TEMPERATURE: 97.9 F | HEART RATE: 76 BPM | HEIGHT: 72 IN | BODY MASS INDEX: 33.86 KG/M2 | DIASTOLIC BLOOD PRESSURE: 70 MMHG | SYSTOLIC BLOOD PRESSURE: 107 MMHG

## 2024-06-04 DIAGNOSIS — M25.519 CHRONIC SHOULDER PAIN, UNSPECIFIED LATERALITY: ICD-10-CM

## 2024-06-04 DIAGNOSIS — A04.8 HELICOBACTER PYLORI (H. PYLORI) INFECTION: ICD-10-CM

## 2024-06-04 DIAGNOSIS — H04.123 DRY EYES: ICD-10-CM

## 2024-06-04 DIAGNOSIS — M79.2 NEUROPATHIC PAIN: ICD-10-CM

## 2024-06-04 DIAGNOSIS — R10.30 LOWER ABDOMINAL PAIN: ICD-10-CM

## 2024-06-04 DIAGNOSIS — G89.29 CHRONIC SHOULDER PAIN, UNSPECIFIED LATERALITY: ICD-10-CM

## 2024-06-04 DIAGNOSIS — K59.00 CONSTIPATION, UNSPECIFIED CONSTIPATION TYPE: ICD-10-CM

## 2024-06-04 DIAGNOSIS — R51.9 NONINTRACTABLE HEADACHE, UNSPECIFIED CHRONICITY PATTERN, UNSPECIFIED HEADACHE TYPE: ICD-10-CM

## 2024-06-04 DIAGNOSIS — Z00.00 PERIODIC HEALTH ASSESSMENT, GENERAL SCREENING, ADULT: Primary | ICD-10-CM

## 2024-06-04 DIAGNOSIS — E78.5 HYPERLIPIDEMIA, UNSPECIFIED HYPERLIPIDEMIA TYPE: ICD-10-CM

## 2024-06-04 LAB
CHOLEST SERPL-MCNC: 253 MG/DL (ref 0–199)
CHOLESTEROL/HDL RATIO: 7.6
EST. AVERAGE GLUCOSE BLD GHB EST-MCNC: 140 MG/DL
HBA1C MFR BLD: 6.5 %
HDLC SERPL-MCNC: 33.3 MG/DL
LDLC SERPL CALC-MCNC: 185 MG/DL
NON HDL CHOLESTEROL: 220 MG/DL (ref 0–149)
TRIGL SERPL-MCNC: 172 MG/DL (ref 0–149)
VLDL: 34 MG/DL (ref 0–40)

## 2024-06-04 PROCEDURE — 83036 HEMOGLOBIN GLYCOSYLATED A1C: CPT

## 2024-06-04 PROCEDURE — 80061 LIPID PANEL: CPT

## 2024-06-04 PROCEDURE — 36415 COLL VENOUS BLD VENIPUNCTURE: CPT

## 2024-06-04 RX ORDER — PANTOPRAZOLE SODIUM 40 MG/1
40 TABLET, DELAYED RELEASE ORAL 2 TIMES DAILY
Qty: 60 TABLET | Refills: 2 | Status: SHIPPED | OUTPATIENT
Start: 2024-06-04

## 2024-06-04 RX ORDER — BACLOFEN 10 MG/1
10 TABLET ORAL 2 TIMES DAILY
Qty: 60 TABLET | Refills: 3 | Status: SHIPPED | OUTPATIENT
Start: 2024-06-04

## 2024-06-04 RX ORDER — CARBOXYMETHYLCELLULOSE SODIUM 5 MG/ML
1 SOLUTION/ DROPS OPHTHALMIC AS NEEDED
Start: 2024-06-04

## 2024-06-04 RX ORDER — LIDOCAINE 50 MG/G
1 PATCH TOPICAL DAILY
Qty: 14 PATCH | Refills: 2 | Status: SHIPPED | OUTPATIENT
Start: 2024-06-04 | End: 2025-06-04

## 2024-06-04 RX ORDER — ATORVASTATIN CALCIUM 40 MG/1
40 TABLET, FILM COATED ORAL NIGHTLY
Qty: 90 TABLET | Refills: 2 | Status: SHIPPED | OUTPATIENT
Start: 2024-06-04

## 2024-06-04 RX ORDER — PREGABALIN 25 MG/1
50 CAPSULE ORAL 3 TIMES DAILY
Qty: 180 CAPSULE | Refills: 3 | Status: SHIPPED | OUTPATIENT
Start: 2024-06-04 | End: 2024-10-02

## 2024-06-04 RX ORDER — DOCUSATE SODIUM 100 MG/1
100 CAPSULE, LIQUID FILLED ORAL 2 TIMES DAILY PRN
Qty: 60 CAPSULE | Refills: 2 | Status: SHIPPED | OUTPATIENT
Start: 2024-06-04

## 2024-06-04 ASSESSMENT — ENCOUNTER SYMPTOMS
LOSS OF SENSATION IN FEET: 0
DEPRESSION: 0
OCCASIONAL FEELINGS OF UNSTEADINESS: 0

## 2024-06-04 ASSESSMENT — PAIN SCALES - GENERAL: PAINLEVEL: 7

## 2024-06-04 ASSESSMENT — COLUMBIA-SUICIDE SEVERITY RATING SCALE - C-SSRS
6. HAVE YOU EVER DONE ANYTHING, STARTED TO DO ANYTHING, OR PREPARED TO DO ANYTHING TO END YOUR LIFE?: NO
2. HAVE YOU ACTUALLY HAD ANY THOUGHTS OF KILLING YOURSELF?: NO
1. IN THE PAST MONTH, HAVE YOU WISHED YOU WERE DEAD OR WISHED YOU COULD GO TO SLEEP AND NOT WAKE UP?: NO

## 2024-06-04 ASSESSMENT — PATIENT HEALTH QUESTIONNAIRE - PHQ9
2. FEELING DOWN, DEPRESSED OR HOPELESS: NOT AT ALL
SUM OF ALL RESPONSES TO PHQ9 QUESTIONS 1 AND 2: 0
1. LITTLE INTEREST OR PLEASURE IN DOING THINGS: NOT AT ALL

## 2024-06-04 NOTE — PATIENT INSTRUCTIONS
As discussed today, our plan is:     Labs - we are drawing your labs today and will call you with any abnormal results  We refilled your medications, please take them as directed. Your medication called protonix (pantoprazole) we want you to wait to start taking until you have your breath test.  We ordered a breath test and a repeat colonoscopy for you to get again. Remember to limit your diet to clear liquids two days prior to your colonoscopy and take your laxatives as directed  We also ordered you a CAT scan to evaluate your belly pain for a possible hernia, please get this done before your next visit  We have also referred you to get a test called an EMG for your pain in your legs    Please come back to see me in: 1 month  ------  If you have any problems or questions, please contact the clinic at [phone] to leave a message. Our fax number is [phone]. If your issue cannot wait until the next business day, please go to urgent care or the emergency department.     I also strongly urge all of my patients to register for [facility] by going to: [email or web address]  (The  staff can also send you a text/email link to register when you check out).    No shows: It is understandable if you are unable to make it to a visit, but please cancel your appointment instead of not showing up. This helps to give other patients access to primary care and keeps wait times low.      Dr. Ayad Bush

## 2024-06-04 NOTE — PROGRESS NOTES
HPI:  Kamaljit Champion Jr. is a 68 y.o. male w/ PMH of Pre-DM and ED who presents today for follow up.     [place] [facility] [place] for temporal headaches, dizziness, and burning leg pain. He also reported some blurry vision. He endorsed heartburn and dysphagia as well. Patient was referred to general surgery for temporal artery biopsy and also told to follow up with neurology. He was switched from gabapentin to lyrica for his pain. He also was ordered an EGD in addition to his screening colonoscopy.    Since this visit patient went to see neurology in 2/24 and was sent directly to ED for urgent GCA workup given his symptoms. In the ED his labs were not significant for inflammation and ophthalmology saw him and said there was low concern for GCA given exam and lab findings. Also, per previous neuro evaluations, his headaches were found to be positional given his hx of CSF leak. He was discharged home with neurology follow up.     Patient then underwent colonoscopy/EGD on 2/13/24; colonoscopy had poor prep and EGD showed some patchy areas of erythema which were biopsied. Biopsies showed H. Pylori so patient was prescribed 2 week course of quadruple therapy however patient did not take pepto-bismol. Patient was recommended repeat colonoscopy and also urease breath test for confirmation of eradication.     Today, patient states his stomach still hurts today. Says he took 2 pills for 14 days with improvement in his symptoms, but then after he ran out he had some worsening of his symptoms. Says he ran out of protonix about a month ago and hasn't taken it since. Says pain comes and goes, says it is worse with eating. Says his stool is black at times, no diarrhea/constipation, n/v. Denies f/c. States he feels food sometimes gets stuck in his throat. Also endorses worsening of abdominal pain when he leans over and improvement when he lies on his back.     Says he still gets headaches about 2-3 times per week which is better  than before. Says he was given steroids but stopped taking them when he ran out. Says his vision is still blurry.     Medications:    Current Outpatient Medications:     acetaminophen (Tylenol) 500 mg tablet, Take 1 tablet (500 mg) by mouth every 4 hours if needed for mild pain (1 - 3)., Disp: , Rfl:     atorvastatin (Lipitor) 20 mg tablet, Take 1 tablet (20 mg) by mouth once daily., Disp: 30 tablet, Rfl: 11    atorvastatin (Lipitor) 40 mg tablet, Take 1 tablet (40 mg) by mouth once daily at bedtime., Disp: , Rfl:     baclofen (Lioresal) 10 mg tablet, Take 1 tablet (10 mg) by mouth 2 times a day. (Patient not taking: Reported on 2/13/2024), Disp: 10 tablet, Rfl: 3    calcium carbonate-vitamin D3 500 mg-5 mcg (200 unit) tablet, Take 1 tablet by mouth once daily. (Patient not taking: Reported on 2/13/2024), Disp: 30 tablet, Rfl: 11    clotrimazole-betamethasone (Lotrisone) cream, Apply 1 Application topically 2 times a day. (Patient not taking: Reported on 2/13/2024), Disp: 15 g, Rfl: 3    dextran 70-hypromellose (Bion Tears) 0.1-0.3 % ophthalmic solution, Administer 1 drop into the left eye 4 times a day as needed for dry eyes. (Patient not taking: Reported on 2/13/2024), Disp: 15 mL, Rfl: 0    docusate sodium (Colace) 100 mg capsule, Take 1 capsule (100 mg) by mouth., Disp: , Rfl:     lubricating eye drops (Refresh Plus) ophthalmic solution, Administer 1 drop into both eyes if needed for dry eyes. (Patient not taking: Reported on 2/13/2024), Disp: 30 each, Rfl: 6    naloxone (Narcan) 4 mg/0.1 mL nasal spray, Administer 1 spray (4 mg) into affected nostril(s) if needed for opioid reversal. May repeat every 2-3 minutes if needed, alternating nostrils, until medical assistance becomes available. (Patient not taking: Reported on 2/13/2024), Disp: 2 each, Rfl: 0    pantoprazole (ProtoNix) 40 mg EC tablet, Take 1 tablet (40 mg) by mouth 2 times a day for 14 days. Do not crush, chew, or split., Disp: 28 tablet, Rfl: 0     polyethylene glycol (Glycolax, Miralax) 17 gram/dose powder, , Disp: , Rfl:     polyethylene glycol (Glycolax, Miralax) 17 gram/dose powder, Take 17 g by mouth once daily. Take 17 grams (1 capful) once daily starting 7 days prior to colonoscopy., Disp: 119 g, Rfl: 0    pregabalin (Lyrica) 25 mg capsule, Take 2 capsules (50 mg) by mouth 3 times a day. (Patient not taking: Reported on 2/13/2024), Disp: 180 capsule, Rfl: 3    RANITIDINE HCL ORAL, Take 150 mg by mouth., Disp: , Rfl:     traMADol (Ultram) 50 mg tablet, Take 1 tablet (50 mg) by mouth 3 times a day. (Patient not taking: Reported on 2/13/2024), Disp: 30 tablet, Rfl: 0    Allergies:  No Known Allergies    Vitals:  /70 (BP Location: Right arm, Patient Position: Sitting, BP Cuff Size: Adult)   Pulse 76   Temp 36.6 °C (97.9 °F) (Temporal)   Ht 1.829 m (6')   Wt 113 kg (250 lb)   SpO2 93%   BMI 33.91 kg/m²       Physical exam:  Constitutional: Obese male in no acute distress.  HEENT: Normocephalic, atraumatic. R eye patch, L eye with some lens opacification. PERRL. EOMI. No cervical lymphadenopathy.  Respiratory: CTA bilaterally. No wheezes, rales, or rhonchi. Normal respiratory effort.  Cardiovascular: RRR. No murmurs, gallops, or rubs. No JVD. Radial pulses 2+.  Abdominal: Soft, mildly distended, tender to palpation in lower quadrants. Possible hernia suspected in LLQ. Bowel sounds present. No hepatosplenomegaly or masses. No CVA tenderness.  Neuro: CN II-XII intact. UE and LE strength 5/5 bilaterally and sensation intact. Normal FTN testing.  MSK: No LE edema bilaterally.  Skin: Warm, dry. No rashes or wounds.  Psych: Appropriate mood and affect.    Labs:  No results found for this or any previous visit (from the past 24 hour(s)).    Imaging:  No results found.    Assessment and plan:  Kamaljit Champion Jr. is a 68 y.o. male w/ PMH of Pre-DM and ED who presents today for follow up. Today he still is having lower abdominal pain and endorses continued  blurry vision which is not worse and also improvement in his headaches. Some concern for hernia on physical exam so will order CT A/P along with UBT for H. Pylori eradication.     Updates:  -Ordered UBT for H. Pylori eradication and repeat colonoscopy   -Ordered CT A/P for possible hernia/abdominal pain  -Bud ESR/CRP/B12/folate/lipids/A1c today  -Refilled medications      #Dizziness  #Headache/Vision Changes  :: Neurology c/f CSF leak vs Dysautonomia  :: Orothstats negative  :: (11/2023) SHER(+), Reflex negative  ::Low concern for GCA per optho evaluation in ED  ::Headaches improved in frequency since patient went to ED and was given a course of steroids  -MRI brain and C spine for evidence of CSF leak on hold given he has metal in his head  -Patient has optho appointment later this month, appreciate input on likelihood of GCA     #GERD  #H. Pylori infection  :: Has alarm system with dysphagia  :: EGD done 2/24 showing h. Pyolri gastritis  ::s/p quadruple therapy x14 days  -urease breath test ordered, must hold PPI for 14 days prior  #Left Sided Shoulder Pain  -Lidocaine patch PRN  -Referral Physical Therapy still active      #lumbar radiculopathy  #Back pain  ::EMG done consistent with lumbar radiculopathy, not peripheral neuropathy  -Cw Pregabalin 50 TID for neuropathic pain  -Cw baclofen  -B12, Folate pending    #prediabetes  ::Last A1c 6.0 , reordered today    #Constipation  -Miralax 17 gm daily PRN  -Docusate     #ED  -Previously was following with Urology, last saw urology 10/2019  -Holding off on ED meds given dizziness, can discuss at next visit  -Previously was having yearly PSA's with Urology, last PSA 3/2019: 0.7, will discuss at next visit     #HLD  ::ASCVD-22.7% - rechecked lipid panel today  -C/w Atorva 40 daily - was not previously taking  -Encouraged lifestyle modification         Health Maintenance:  Pneumovax (3/2015) and prevnar (1/2015)  Zostavax (3/2015)  Influeza: 11/2023  Colonoscopy completed  3/2017 (5 mm polyp in cecum; path:sessile serrated, 5 mm polyp in transverse colon- tubular adenoma), repeat in 2024 bad prep so patient repeat ordered today  COVID vaccine: vaccinated and boosted according to patient  AAA screening: No evidence of aneurysms (11/2023)  Lung Cancer Screening: (11/2023) stable nodulues, repeat in 11/2024      Follow-up in 1 month.    Patient and plan discussed with attending physician, Dr. Young.    Ayad Bush MD  PGY-1 Internal Medicine  [facility]

## 2024-06-05 DIAGNOSIS — R51.9 NONINTRACTABLE HEADACHE, UNSPECIFIED CHRONICITY PATTERN, UNSPECIFIED HEADACHE TYPE: ICD-10-CM

## 2024-06-05 DIAGNOSIS — E11.69 TYPE 2 DIABETES MELLITUS WITH OTHER SPECIFIED COMPLICATION, WITHOUT LONG-TERM CURRENT USE OF INSULIN (MULTI): Primary | ICD-10-CM

## 2024-06-05 RX ORDER — METFORMIN HYDROCHLORIDE 500 MG/1
500 TABLET ORAL
Qty: 90 TABLET | Refills: 3 | Status: SHIPPED | OUTPATIENT
Start: 2024-06-05

## 2024-06-05 NOTE — PROGRESS NOTES
Patient's A1c came back at 6.5 from 6.0, called him to inform him I am starting him on Metformin 500mg every day with plans to up titrate it on the next visit if pt tolerates. Patient understands and will  from pharmacy. Lipid panel also elevated though patient has not been taking statin, patient just restarted on atorva 40mg yesterday.

## 2024-06-10 PROBLEM — H40.002 GLAUCOMA SUSPECT OF LEFT EYE: Status: ACTIVE | Noted: 2023-11-28

## 2024-06-11 ENCOUNTER — OFFICE VISIT (OUTPATIENT)
Dept: OPHTHALMOLOGY | Facility: CLINIC | Age: 69
End: 2024-06-11
Payer: COMMERCIAL

## 2024-06-11 DIAGNOSIS — H25.812 COMBINED FORM OF AGE-RELATED CATARACT, LEFT EYE: ICD-10-CM

## 2024-06-11 DIAGNOSIS — H04.122 DRY EYE SYNDROME OF LEFT LACRIMAL GLAND: ICD-10-CM

## 2024-06-11 DIAGNOSIS — H40.002 GLAUCOMA SUSPECT OF LEFT EYE: ICD-10-CM

## 2024-06-11 DIAGNOSIS — H52.02 HYPEROPIA OF LEFT EYE: ICD-10-CM

## 2024-06-11 DIAGNOSIS — H40.003 GLAUCOMA SUSPECT OF BOTH EYES: Primary | ICD-10-CM

## 2024-06-11 DIAGNOSIS — E11.9 DIABETES MELLITUS TYPE 2 WITHOUT RETINOPATHY (MULTI): ICD-10-CM

## 2024-06-11 DIAGNOSIS — H04.123 DRY EYE SYNDROME OF BOTH EYES: ICD-10-CM

## 2024-06-11 PROCEDURE — 92014 COMPRE OPH EXAM EST PT 1/>: CPT | Performed by: STUDENT IN AN ORGANIZED HEALTH CARE EDUCATION/TRAINING PROGRAM

## 2024-06-11 PROCEDURE — 92133 CPTRZD OPH DX IMG PST SGM ON: CPT | Performed by: STUDENT IN AN ORGANIZED HEALTH CARE EDUCATION/TRAINING PROGRAM

## 2024-06-11 PROCEDURE — 92015 DETERMINE REFRACTIVE STATE: CPT | Performed by: STUDENT IN AN ORGANIZED HEALTH CARE EDUCATION/TRAINING PROGRAM

## 2024-06-11 RX ORDER — POLYETHYLENE GLYCOL 400 AND PROPYLENE GLYCOL 4; 3 MG/ML; MG/ML
1 SOLUTION/ DROPS OPHTHALMIC 3 TIMES DAILY
Qty: 15 ML | Refills: 6 | Status: SHIPPED | OUTPATIENT
Start: 2024-06-11 | End: 2024-07-11

## 2024-06-11 ASSESSMENT — ENCOUNTER SYMPTOMS
EYES NEGATIVE: 0
PSYCHIATRIC NEGATIVE: 0
GASTROINTESTINAL NEGATIVE: 0
RESPIRATORY NEGATIVE: 0
CONSTITUTIONAL NEGATIVE: 0
HEMATOLOGIC/LYMPHATIC NEGATIVE: 0
ALLERGIC/IMMUNOLOGIC NEGATIVE: 0
ENDOCRINE NEGATIVE: 0
CARDIOVASCULAR NEGATIVE: 0
MUSCULOSKELETAL NEGATIVE: 0
NEUROLOGICAL NEGATIVE: 0

## 2024-06-11 ASSESSMENT — CONF VISUAL FIELD
OS_NORMAL: 1
OS_SUPERIOR_TEMPORAL_RESTRICTION: 0
OD_INFERIOR_TEMPORAL_RESTRICTION: 1
METHOD: COUNTING FINGERS
OD_INFERIOR_NASAL_RESTRICTION: 1
OS_SUPERIOR_NASAL_RESTRICTION: 0
OS_INFERIOR_NASAL_RESTRICTION: 0
OS_INFERIOR_TEMPORAL_RESTRICTION: 0
OD_SUPERIOR_NASAL_RESTRICTION: 1
OD_SUPERIOR_TEMPORAL_RESTRICTION: 1

## 2024-06-11 ASSESSMENT — REFRACTION_MANIFEST
OD_SPHERE: BALANCE
OS_SPHERE: +0.50
OS_CYLINDER: +1.00
OS_ADD: +2.50
OS_AXIS: 158

## 2024-06-11 ASSESSMENT — SLIT LAMP EXAM - LIDS
COMMENTS: GOOD POSITION, MGD UL/LL
COMMENTS: NORMAL

## 2024-06-11 ASSESSMENT — PACHYMETRY
OS_CT(UM): 504
OD_CT(UM): 498

## 2024-06-11 ASSESSMENT — TONOMETRY
OS_IOP_MMHG: 15
IOP_METHOD: GOLDMANN APPLANATION

## 2024-06-11 ASSESSMENT — REFRACTION_WEARINGRX
OS_AXIS: 158
OS_CYLINDER: +0.75
OS_ADD: +2.50
OD_SPHERE: BALANCE
OS_SPHERE: +0.25

## 2024-06-11 ASSESSMENT — EXTERNAL EXAM - LEFT EYE: OS_EXAM: NORMAL

## 2024-06-11 ASSESSMENT — VISUAL ACUITY
METHOD: SNELLEN - LINEAR
OS_SC+: -1
OS_SC: 20/60

## 2024-06-11 ASSESSMENT — CUP TO DISC RATIO: OS_RATIO: .3

## 2024-06-11 ASSESSMENT — EXTERNAL EXAM - RIGHT EYE: OD_EXAM: NORMAL
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
## 2024-06-24 ENCOUNTER — TELEPHONE (OUTPATIENT)
Dept: PRIMARY CARE | Facility: HOSPITAL | Age: 69
End: 2024-06-24
Payer: COMMERCIAL

## 2024-06-26 DIAGNOSIS — E78.5 HYPERLIPIDEMIA, UNSPECIFIED HYPERLIPIDEMIA TYPE: ICD-10-CM

## 2024-06-26 RX ORDER — ATORVASTATIN CALCIUM 40 MG/1
40 TABLET, FILM COATED ORAL NIGHTLY
Qty: 90 TABLET | Refills: 2 | Status: SHIPPED | OUTPATIENT
Start: 2024-06-26

## 2024-07-11 ENCOUNTER — APPOINTMENT (OUTPATIENT)
Dept: SURGERY | Facility: CLINIC | Age: 69
End: 2024-07-11
Payer: COMMERCIAL

## 2024-07-16 ENCOUNTER — TELEPHONE (OUTPATIENT)
Dept: INTERNAL MEDICINE | Facility: HOSPITAL | Age: 69
End: 2024-07-16
Payer: COMMERCIAL

## 2024-07-16 NOTE — TELEPHONE ENCOUNTER
I reached out to patient regarding completion of Urea Breath test and repeat Colonoscopy but, he was unavailable via phone. I left a HIPAA compliant message to call me back when he gets a chance.

## 2024-08-05 ENCOUNTER — APPOINTMENT (OUTPATIENT)
Dept: RHEUMATOLOGY | Facility: CLINIC | Age: 69
End: 2024-08-05
Payer: COMMERCIAL

## 2024-10-15 ENCOUNTER — APPOINTMENT (OUTPATIENT)
Dept: OPHTHALMOLOGY | Facility: CLINIC | Age: 69
End: 2024-10-15
Payer: COMMERCIAL

## 2024-10-15 DIAGNOSIS — H40.002 GLAUCOMA SUSPECT OF LEFT EYE: Primary | ICD-10-CM

## 2024-10-15 DIAGNOSIS — H04.123 DRY EYE SYNDROME OF LACRIMAL GLAND, BILATERAL: ICD-10-CM

## 2024-10-15 PROCEDURE — 99213 OFFICE O/P EST LOW 20 MIN: CPT | Performed by: STUDENT IN AN ORGANIZED HEALTH CARE EDUCATION/TRAINING PROGRAM

## 2024-10-15 RX ORDER — LIFITEGRAST 50 MG/ML
1 SOLUTION/ DROPS OPHTHALMIC 2 TIMES DAILY
Qty: 180 EACH | Refills: 3 | Status: SHIPPED | OUTPATIENT
Start: 2024-10-15 | End: 2025-01-13

## 2024-10-15 ASSESSMENT — TONOMETRY
IOP_METHOD: GOLDMANN APPLANATION
OS_IOP_MMHG: 20

## 2024-10-15 ASSESSMENT — CONF VISUAL FIELD
OS_INFERIOR_NASAL_RESTRICTION: 0
OS_SUPERIOR_NASAL_RESTRICTION: 0
OS_SUPERIOR_TEMPORAL_RESTRICTION: 0
OS_INFERIOR_TEMPORAL_RESTRICTION: 0
OS_NORMAL: 1

## 2024-10-15 ASSESSMENT — ENCOUNTER SYMPTOMS
PSYCHIATRIC NEGATIVE: 0
ENDOCRINE NEGATIVE: 0
EYES NEGATIVE: 0
ALLERGIC/IMMUNOLOGIC NEGATIVE: 0
CARDIOVASCULAR NEGATIVE: 0
GASTROINTESTINAL NEGATIVE: 0
CONSTITUTIONAL NEGATIVE: 0
HEMATOLOGIC/LYMPHATIC NEGATIVE: 0
MUSCULOSKELETAL NEGATIVE: 0
NEUROLOGICAL NEGATIVE: 0
RESPIRATORY NEGATIVE: 0

## 2024-10-15 ASSESSMENT — VISUAL ACUITY
OS_SC: 20/25
METHOD: SNELLEN - LINEAR

## 2024-10-15 ASSESSMENT — CUP TO DISC RATIO: OS_RATIO: .3

## 2024-10-15 ASSESSMENT — REFRACTION_MANIFEST
OS_AXIS: 158
OS_CYLINDER: +1.00
OS_ADD: +2.50
OD_SPHERE: BALANCE
OS_SPHERE: +0.50

## 2024-10-15 ASSESSMENT — SLIT LAMP EXAM - LIDS
COMMENTS: GOOD POSITION, MGD UL/LL
COMMENTS: NORMAL

## 2024-10-15 ASSESSMENT — EXTERNAL EXAM - LEFT EYE: OS_EXAM: NORMAL

## 2024-10-15 ASSESSMENT — PACHYMETRY
OD_CT(UM): 498
OS_CT(UM): 504

## 2024-10-15 ASSESSMENT — EXTERNAL EXAM - RIGHT EYE: OD_EXAM: NORMAL
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
## 2025-02-24 DIAGNOSIS — R51.9 CHRONIC INTRACTABLE HEADACHE, UNSPECIFIED HEADACHE TYPE: Primary | ICD-10-CM

## 2025-02-24 DIAGNOSIS — M79.2 NEUROPATHIC PAIN: ICD-10-CM

## 2025-02-24 DIAGNOSIS — G89.29 CHRONIC INTRACTABLE HEADACHE, UNSPECIFIED HEADACHE TYPE: Primary | ICD-10-CM

## 2025-02-26 RX ORDER — PREGABALIN 25 MG/1
CAPSULE ORAL
Qty: 180 CAPSULE | Refills: 3 | OUTPATIENT
Start: 2025-02-26

## 2025-03-12 ENCOUNTER — TELEPHONE (OUTPATIENT)
Dept: PRIMARY CARE | Facility: HOSPITAL | Age: 70
End: 2025-03-12
Payer: COMMERCIAL

## 2025-03-12 DIAGNOSIS — M79.2 NEUROPATHIC PAIN: ICD-10-CM

## 2025-03-12 RX ORDER — PREGABALIN 25 MG/1
50 CAPSULE ORAL 3 TIMES DAILY
Qty: 180 CAPSULE | Refills: 0 | Status: SHIPPED | OUTPATIENT
Start: 2025-03-12 | End: 2025-04-11

## 2025-03-12 NOTE — TELEPHONE ENCOUNTER
Patient requesting a refill on Pregabalin. Patient states he feet are hurting really bad and the pain is waking him up at night.    [facility] pharmacy

## 2025-03-12 NOTE — PROGRESS NOTES
Attempted to call but patient unavailable x3. He requested lyrica refill. Last visit 6/2024 with recommendation to follow up in 1 month. I will refill 1 month but would like patient to be seen in clinic. PDMP review suggests low risk for overdose or misuse- only fills lyrica from . Would advise next [facility] providers to encourage patient to be seen in clinic prior to further prescribing

## 2025-03-13 DIAGNOSIS — G62.9 PERIPHERAL POLYNEUROPATHY: Primary | ICD-10-CM

## 2025-03-13 RX ORDER — CAPSAICIN 0.75 MG/G
CREAM TOPICAL 4 TIMES DAILY
Qty: 56 G | Refills: 3 | Status: SHIPPED | OUTPATIENT
Start: 2025-03-13 | End: 2026-03-13

## 2025-03-13 NOTE — PROGRESS NOTES
When patient went to pharmacy, pharmacist instructed us that either a Prior auth needs to be started for pregabalin or we can prescribe a higher dosed tablet with that he can cut in half.     I Initiated prior authorization for Pregabalin 50 TID for neuropathic pain. He has trialed gabapentin in past without success.     Patient requested a cream to help with the neuropathic pain. Complainig that he is unable to sleep because of it. I prescribed zotrix (capsicum) cream to apply - hope that it will be approved. However it may require ~8 to 12 weeks of treatment at a therapeutic dose to adequately assess efficacy.

## 2025-09-03 ENCOUNTER — APPOINTMENT (OUTPATIENT)
Dept: OPHTHALMOLOGY | Facility: CLINIC | Age: 70
End: 2025-09-03
Payer: COMMERCIAL

## 2025-09-03 ASSESSMENT — SLIT LAMP EXAM - LIDS
COMMENTS: NORMAL
COMMENTS: GOOD POSITION, MGD UL/LL

## 2025-09-03 ASSESSMENT — CONF VISUAL FIELD
OD_INFERIOR_NASAL_RESTRICTION: 1
OS_INFERIOR_NASAL_RESTRICTION: 0
OS_SUPERIOR_TEMPORAL_RESTRICTION: 0
OD_SUPERIOR_TEMPORAL_RESTRICTION: 1
OD_SUPERIOR_NASAL_RESTRICTION: 1
OS_SUPERIOR_NASAL_RESTRICTION: 0
OS_INFERIOR_TEMPORAL_RESTRICTION: 0
OS_NORMAL: 1
OD_INFERIOR_TEMPORAL_RESTRICTION: 1

## 2025-09-03 ASSESSMENT — VISUAL ACUITY
METHOD: SNELLEN - LINEAR
OS_SC: 20/50

## 2025-09-03 ASSESSMENT — GONIOSCOPY
OS_NASAL: PTM
OS_SUPERIOR: ATM
OS_INFERIOR: PTM
OS_TEMPORAL: ATM

## 2025-09-03 ASSESSMENT — ENCOUNTER SYMPTOMS
ALLERGIC/IMMUNOLOGIC NEGATIVE: 0
PSYCHIATRIC NEGATIVE: 0
HEMATOLOGIC/LYMPHATIC NEGATIVE: 0
CONSTITUTIONAL NEGATIVE: 0
GASTROINTESTINAL NEGATIVE: 0
CARDIOVASCULAR NEGATIVE: 0
ENDOCRINE NEGATIVE: 0
NEUROLOGICAL NEGATIVE: 0
MUSCULOSKELETAL NEGATIVE: 0
EYES NEGATIVE: 1
RESPIRATORY NEGATIVE: 0

## 2025-09-03 ASSESSMENT — TONOMETRY
IOP_METHOD: GOLDMANN APPLANATION
OS_IOP_MMHG: 14

## 2025-09-03 ASSESSMENT — PACHYMETRY: OS_CT(UM): 582

## 2025-09-03 ASSESSMENT — EXTERNAL EXAM - RIGHT EYE: OD_EXAM: NORMAL

## 2025-09-03 ASSESSMENT — CUP TO DISC RATIO: OS_RATIO: .5

## 2025-09-03 ASSESSMENT — EXTERNAL EXAM - LEFT EYE: OS_EXAM: NORMAL

## 2025-09-15 ENCOUNTER — APPOINTMENT (OUTPATIENT)
Dept: OPHTHALMOLOGY | Facility: CLINIC | Age: 70
End: 2025-09-15
Payer: COMMERCIAL

## 2026-03-04 ENCOUNTER — APPOINTMENT (OUTPATIENT)
Dept: OPHTHALMOLOGY | Facility: CLINIC | Age: 71
End: 2026-03-04
Payer: COMMERCIAL